# Patient Record
Sex: MALE | Employment: FULL TIME | URBAN - METROPOLITAN AREA
[De-identification: names, ages, dates, MRNs, and addresses within clinical notes are randomized per-mention and may not be internally consistent; named-entity substitution may affect disease eponyms.]

---

## 2017-01-28 ENCOUNTER — HOSPITAL ENCOUNTER (EMERGENCY)
Facility: CLINIC | Age: 72
Discharge: HOME | End: 2017-01-28
Attending: EMERGENCY MEDICINE | Admitting: EMERGENCY MEDICINE
Payer: COMMERCIAL

## 2017-01-28 ENCOUNTER — APPOINTMENT (OUTPATIENT)
Dept: GENERAL RADIOLOGY | Facility: CLINIC | Age: 72
End: 2017-01-28
Attending: EMERGENCY MEDICINE
Payer: COMMERCIAL

## 2017-01-28 VITALS
SYSTOLIC BLOOD PRESSURE: 144 MMHG | WEIGHT: 175 LBS | TEMPERATURE: 98.1 F | OXYGEN SATURATION: 97 % | DIASTOLIC BLOOD PRESSURE: 85 MMHG | RESPIRATION RATE: 18 BRPM | BODY MASS INDEX: 26.52 KG/M2 | HEIGHT: 68 IN | HEART RATE: 66 BPM

## 2017-01-28 DIAGNOSIS — R07.9 CHEST PAIN, UNSPECIFIED TYPE: ICD-10-CM

## 2017-01-28 LAB
ANION GAP SERPL CALCULATED.3IONS-SCNC: 7 MMOL/L (ref 3–14)
BASOPHILS # BLD AUTO: 0 10E9/L (ref 0–0.2)
BASOPHILS NFR BLD AUTO: 0.6 %
BUN SERPL-MCNC: 19 MG/DL (ref 7–30)
CALCIUM SERPL-MCNC: 8.7 MG/DL (ref 8.5–10.1)
CHLORIDE SERPL-SCNC: 108 MMOL/L (ref 94–109)
CO2 SERPL-SCNC: 28 MMOL/L (ref 20–32)
CREAT SERPL-MCNC: 1.05 MG/DL (ref 0.66–1.25)
DIFFERENTIAL METHOD BLD: NORMAL
EOSINOPHIL # BLD AUTO: 0.2 10E9/L (ref 0–0.7)
EOSINOPHIL NFR BLD AUTO: 3.4 %
ERYTHROCYTE [DISTWIDTH] IN BLOOD BY AUTOMATED COUNT: 13.4 % (ref 10–15)
GFR SERPL CREATININE-BSD FRML MDRD: 70 ML/MIN/1.7M2
GLUCOSE SERPL-MCNC: 75 MG/DL (ref 70–99)
HCT VFR BLD AUTO: 49.2 % (ref 40–53)
HGB BLD-MCNC: 16.9 G/DL (ref 13.3–17.7)
IMM GRANULOCYTES # BLD: 0 10E9/L (ref 0–0.4)
IMM GRANULOCYTES NFR BLD: 0.6 %
LYMPHOCYTES # BLD AUTO: 1.3 10E9/L (ref 0.8–5.3)
LYMPHOCYTES NFR BLD AUTO: 20 %
MCH RBC QN AUTO: 31.1 PG (ref 26.5–33)
MCHC RBC AUTO-ENTMCNC: 34.3 G/DL (ref 31.5–36.5)
MCV RBC AUTO: 91 FL (ref 78–100)
MONOCYTES # BLD AUTO: 0.5 10E9/L (ref 0–1.3)
MONOCYTES NFR BLD AUTO: 7.9 %
NEUTROPHILS # BLD AUTO: 4.2 10E9/L (ref 1.6–8.3)
NEUTROPHILS NFR BLD AUTO: 67.5 %
NRBC # BLD AUTO: 0 10*3/UL
NRBC BLD AUTO-RTO: 0 /100
PLATELET # BLD AUTO: 199 10E9/L (ref 150–450)
POTASSIUM SERPL-SCNC: 3.8 MMOL/L (ref 3.4–5.3)
RBC # BLD AUTO: 5.43 10E12/L (ref 4.4–5.9)
SODIUM SERPL-SCNC: 143 MMOL/L (ref 133–144)
TROPONIN I SERPL-MCNC: NORMAL UG/L (ref 0–0.04)
TROPONIN I SERPL-MCNC: NORMAL UG/L (ref 0–0.04)
WBC # BLD AUTO: 6.2 10E9/L (ref 4–11)

## 2017-01-28 PROCEDURE — 80048 BASIC METABOLIC PNL TOTAL CA: CPT | Performed by: EMERGENCY MEDICINE

## 2017-01-28 PROCEDURE — 84484 ASSAY OF TROPONIN QUANT: CPT | Performed by: EMERGENCY MEDICINE

## 2017-01-28 PROCEDURE — 93005 ELECTROCARDIOGRAM TRACING: CPT

## 2017-01-28 PROCEDURE — 85025 COMPLETE CBC W/AUTO DIFF WBC: CPT | Performed by: EMERGENCY MEDICINE

## 2017-01-28 PROCEDURE — 71020 XR CHEST 2 VW: CPT

## 2017-01-28 PROCEDURE — 25000132 ZZH RX MED GY IP 250 OP 250 PS 637: Performed by: EMERGENCY MEDICINE

## 2017-01-28 PROCEDURE — 99285 EMERGENCY DEPT VISIT HI MDM: CPT | Mod: 25

## 2017-01-28 RX ORDER — TAMSULOSIN HYDROCHLORIDE 0.4 MG/1
CAPSULE ORAL DAILY
COMMUNITY
End: 2018-10-16

## 2017-01-28 RX ORDER — NITROGLYCERIN 0.4 MG/1
0.4 TABLET SUBLINGUAL EVERY 5 MIN PRN
Status: DISCONTINUED | OUTPATIENT
Start: 2017-01-28 | End: 2017-01-29 | Stop reason: HOSPADM

## 2017-01-28 RX ORDER — ASPIRIN 325 MG
325 TABLET ORAL ONCE
Status: COMPLETED | OUTPATIENT
Start: 2017-01-28 | End: 2017-01-28

## 2017-01-28 RX ADMIN — NITROGLYCERIN 0.4 MG: 0.4 TABLET SUBLINGUAL at 19:00

## 2017-01-28 RX ADMIN — NITROGLYCERIN 0.4 MG: 0.4 TABLET SUBLINGUAL at 19:14

## 2017-01-28 RX ADMIN — ASPIRIN 325 MG ORAL TABLET 325 MG: 325 PILL ORAL at 19:00

## 2017-01-28 ASSESSMENT — ENCOUNTER SYMPTOMS
NAUSEA: 1
VOMITING: 0

## 2017-01-28 NOTE — ED AVS SNAPSHOT
[facility] Emergency Department    [address]    [facility] [address]    Phone:  [phone]    Fax:  [phone]                                       Myke Fraire   MRN: [id number]    Department:  [facility] Emergency Department   Date of Visit:  1/28/2017           After Visit Summary Signature Page     I have received my discharge instructions, and my questions have been answered. I have discussed any challenges I see with this plan with the nurse or doctor.    ..........................................................................................................................................  Patient/Patient Representative Signature      ..........................................................................................................................................  Patient Representative Print Name and Relationship to Patient    ..................................................               ................................................  Date                                            Time    ..........................................................................................................................................  Reviewed by Signature/Title    ...................................................              ..............................................  Date                                                            Time

## 2017-01-29 LAB — INTERPRETATION ECG - MUSE: NORMAL

## 2017-01-29 NOTE — ED PROVIDER NOTES
History     Chief Complaint:  Chest Pain    HPI   Myke Fraire is a 71 year old male who presents to the emergency department today for evaluation of chest pain. The patient had some chest pain yesterday in the afternoon around 1300 but this pain has been intermittent since he took Atenolol given to him by his significant other. Today he had the pain is again on the left side of the chest. This pain is exacerbated by touching the area and occurred when he was walking around working. He has some nausea but no vomiting. The patient has not taken any Aspirin. He has no history of blood clots.     Allergies:  No Known Drug Allergies       Medications:    Flomax  Gabapentin  Naprosyn  Advil  Centrum Silver  Hydrocodone    Past Medical History:    History reviewed. No pertinent past medical history.     Past Surgical History:    Lumbar spine disc surgery  Cholecystectomy  Gastritis and duodenitis x2  Colonoscopy    Family History:    Mother-stomach cancer  Father-throat cancer     Social History:  The patient was accompanied to the ED by friends.  Smoking Status: never smoker  Alcohol Use: positive    Marital Status:  Single [1]    Review of Systems   Cardiovascular: Positive for chest pain.   Gastrointestinal: Positive for nausea. Negative for vomiting.   All other systems reviewed and are negative.    Physical Exam   Vitals:  Patient Vitals for the past 24 hrs:   BP Temp Temp src Pulse Heart Rate Resp SpO2 Height Weight   01/28/17 2350 144/85 mmHg 98.1  F (36.7  C) Oral - 67 18 97 % - -   01/28/17 2335 135/81 mmHg - - - - - 94 % - -   01/28/17 2320 124/78 mmHg - - - - - 95 % - -   01/28/17 2305 135/82 mmHg - - - - - 96 % - -   01/28/17 2250 139/76 mmHg - - - - - 96 % - -   01/28/17 2235 131/80 mmHg - - - - - 95 % - -   01/28/17 2220 129/80 mmHg - - - - - 95 % - -   01/28/17 2205 135/78 mmHg - - - - - 96 % - -   01/28/17 2150 135/79 mmHg - - - - - 95 % - -   01/28/17 2135 136/81 mmHg - - - - - 97 % - -   01/28/17  "2120 (!) 161/95 mmHg - - - - - 97 % - -   01/28/17 2105 143/88 mmHg - - - - - 98 % - -   01/28/17 2050 (!) 141/92 mmHg - - - - - 96 % - -   01/28/17 2035 140/85 mmHg - - - - - 98 % - -   01/28/17 2020 131/76 mmHg - - - - - 98 % - -   01/28/17 2010 121/73 mmHg - - - - - - - -   01/28/17 1920 112/75 mmHg - - - 60 - 91 % - -   01/28/17 1915 - - - - 57 - 92 % - -   01/28/17 1905 126/76 mmHg - - - 62 - 93 % - -   01/28/17 1852 (!) 128/96 mmHg - - - 57 - 98 % - -   01/28/17 1835 (!) 161/102 mmHg - - - - - - - -   01/28/17 1829 (!) 172/100 mmHg - - - - - - - -   01/28/17 1820 (!) 146/102 mmHg 97.9  F (36.6  C) Oral 66 - 18 96 % 1.727 m (5' 8\") 79.379 kg (175 lb)       Physical Exam  Constitutional: Patient is well appearing. No distress.  Head: Atraumatic.  Mouth/Throat: Oropharynx is clear and moist. No oropharyngeal exudate.  Eyes: Conjunctivae and EOM are normal. No scleral icterus.  Neck: Normal range of motion. Neck supple.   Cardiovascular: Normal rate, regular rhythm, normal heart sounds and intact distal pulses.   Pulmonary/Chest: Breath sounds normal. No respiratory distress.  Abdominal: Soft. Bowel sounds are normal. No distension. No tenderness. No rebound or guarding.   Musculoskeletal: Normal range of motion. No edema or tenderness.   Neurological: Alert and orientated to person, place, and time. No observable focal neuro deficit  Skin: Warm and dry. No rash noted. Not diaphoretic.      Emergency Department Course     ECG:  ECG taken at 1829, ECG read at 1836  Normal sinus rhythm  Normal ECG  Rate 64 bpm. SC interval 162. QRS duration 92. QT/QTc 392/404. P-R-T axes 54 -26 45.     Imaging:  Radiology findings were communicated with the patient who voiced understanding of the findings.    XR Chest 2 Views   IMPRESSION: The lungs are clear. No focal pulmonary opacities. Heart   and mediastinum are stable. No acute cardiopulmonary abnormalities.      ROWAN JO MD     Laboratory:  Laboratory findings were " communicated with the patient who voiced understanding of the findings.    CBC: WBC 6.2, HGB 16.9,   BMP: AWNL (Creatinine 1.05)  Troponin (Collected 1848): <0.015   Troponin (Collected 2229): <0.015     Interventions:  1900 Nitrostat 0.4 mg sublingual  1900 Aspirin 325 mg oral    1914 Nitrostat 0.4 mg sublingual      Emergency Department Course:  Nursing notes and vitals reviewed.  I performed an exam of the patient as documented above.   The patient was sent for a chest x-ray while in the emergency department, results above.    IV was inserted and blood was drawn for laboratory testing, results above.   At 2341 the patient was rechecked on and was updated on the results of his laboratory and imaging studies.    I discussed the treatment plan with the patient. They expressed understanding of this plan and consented to discharge. They will be discharged home with instructions for care and follow up. In addition, the patient will return to the emergency department if their symptoms persist, worsen, if new symptoms arise or if there is any concern.  All questions were answered.  I personally reviewed the laboratory and imaging results with the patient and answered all related questions prior to discharge.    Impression & Plan      Medical Decision Making:  Pt has atypical sx with reproduction with palpation.  No known margarita risk factors.  >24 since sx started.  2 trop neg.  Unlikely acute ACS.  PERC neg.  Unlikely other CV resp cause.  Offered admission and workup vs home and PCP stress and reeval.  Risks and benefits discussed with pt and family.  Shared decision making and wants to go home.     Diagnosis:    ICD-10-CM    1. Chest pain, unspecified type R07.9      Disposition:   Discharge    Scribe Disclosure:  Fransisco STANLEY, am serving as a scribe at 6:30 PM on 1/28/2017 to document services personally performed by Fransisco Wyatt MD, based on my observations and the provider's statements to me.    1/28/2017   [facility] EMERGENCY DEPARTMENT        Fransisco Wyatt MD  01/29/17 0036

## 2017-01-29 NOTE — DISCHARGE INSTRUCTIONS
* Dolor En El Pecho:Causa No Determinada [Chest Pain, Uncertain Cause]    Según de jesus examen de hoy, no se pudo determinar exactamente por qué siente dolor en el pecho. De Jesus afección no parece seria en milla momento y el dolor que siente no parece provenir del corazón. Sin embargo, en ocasiones, los síntomas de un problema burak tardan más en aparecer. Por lo tanto, es importante que preste atención a las advertencias descritas a continuación.  Cuidados En [place]:  1. Descanse hoy y evite toda actividad agotadora.  2. Lyman el medicamento que le hayan recetado según le hayan indicado.  Programe tim VISITA DE CONTROL con de jesus médico en 1-3 días.  Busque Prontamente Atención Médica  si algo de lo siguiente ocurre:    Un cambio en el tipo de dolor: se siente diferente, se ha vuelto más herminio, dura más o comienza a esparcirse al hombro, el brazo, el noe, la mandíbula o la espalda.    Dificultad para respirar o más dolor al respirar.    Debilidad, mareo o desmayo.    Tos con expulsión de esputo (flema [phlegm]) de color oscuro o con mayi.    Fiebre de 101 F (38.3 C) o más kendra.    Dolor, enrojecimiento o hinchazón de tim pierna.    [id number] [facility], [address] [place] [address], [place] [address]. Todos los derechos reservados. Esta información no pretende sustituir la atención médica profesional. Sólo de jesus médico puede diagnosticar y tratar un problema de dilip.

## 2017-01-29 NOTE — ED NOTES
ABCs intact. Pt c/o left sided CP, lightheaded, high blood pressure (187/95 160/87 148/86).    Pt's home meds: see epic

## 2018-10-16 ENCOUNTER — HOSPITAL ENCOUNTER (EMERGENCY)
Facility: CLINIC | Age: 73
Discharge: HOME | End: 2018-10-16
Attending: EMERGENCY MEDICINE | Admitting: EMERGENCY MEDICINE
Payer: COMMERCIAL

## 2018-10-16 ENCOUNTER — APPOINTMENT (OUTPATIENT)
Dept: GENERAL RADIOLOGY | Facility: CLINIC | Age: 73
End: 2018-10-16
Attending: EMERGENCY MEDICINE
Payer: COMMERCIAL

## 2018-10-16 VITALS
BODY MASS INDEX: 26 KG/M2 | TEMPERATURE: 97.9 F | OXYGEN SATURATION: 97 % | WEIGHT: 171 LBS | RESPIRATION RATE: 18 BRPM | SYSTOLIC BLOOD PRESSURE: 153 MMHG | DIASTOLIC BLOOD PRESSURE: 99 MMHG

## 2018-10-16 DIAGNOSIS — M54.50 ACUTE RIGHT-SIDED LOW BACK PAIN WITHOUT SCIATICA: ICD-10-CM

## 2018-10-16 PROCEDURE — 25000132 ZZH RX MED GY IP 250 OP 250 PS 637: Performed by: EMERGENCY MEDICINE

## 2018-10-16 PROCEDURE — 99283 EMERGENCY DEPT VISIT LOW MDM: CPT

## 2018-10-16 PROCEDURE — 72100 X-RAY EXAM L-S SPINE 2/3 VWS: CPT

## 2018-10-16 RX ORDER — OXYCODONE HYDROCHLORIDE 5 MG/1
5 TABLET ORAL ONCE
Status: COMPLETED | OUTPATIENT
Start: 2018-10-16 | End: 2018-10-16

## 2018-10-16 RX ORDER — ACETAMINOPHEN 500 MG
1000 TABLET ORAL EVERY 6 HOURS
Qty: 60 TABLET | Refills: 0 | Status: SHIPPED | OUTPATIENT
Start: 2018-10-16 | End: 2018-10-19

## 2018-10-16 RX ORDER — IBUPROFEN 200 MG
400 TABLET ORAL ONCE
Status: COMPLETED | OUTPATIENT
Start: 2018-10-16 | End: 2018-10-16

## 2018-10-16 RX ORDER — IBUPROFEN 200 MG
400 TABLET ORAL EVERY 6 HOURS
Qty: 50 TABLET | Refills: 0 | Status: SHIPPED | OUTPATIENT
Start: 2018-10-16 | End: 2018-10-19

## 2018-10-16 RX ORDER — ACETAMINOPHEN 325 MG/1
975 TABLET ORAL ONCE
Status: COMPLETED | OUTPATIENT
Start: 2018-10-16 | End: 2018-10-16

## 2018-10-16 RX ADMIN — OXYCODONE HYDROCHLORIDE 5 MG: 5 TABLET ORAL at 20:22

## 2018-10-16 RX ADMIN — IBUPROFEN 400 MG: 200 TABLET, FILM COATED ORAL at 20:21

## 2018-10-16 RX ADMIN — ACETAMINOPHEN 975 MG: 325 TABLET, FILM COATED ORAL at 20:21

## 2018-10-16 ASSESSMENT — ENCOUNTER SYMPTOMS
HEMATURIA: 0
BACK PAIN: 1
CONSTIPATION: 0
BLOOD IN STOOL: 0

## 2018-10-16 NOTE — LETTER
October 16, 2018      To Whom It May Concern:      Myke Fraire was seen in our Emergency Department today, 10/16/18.  I expect his condition to improve over the next 3 days.  He may return to work/school when improved with the restriction of no heavy lifting until approved by his primary doctor.    Sincerely,        Markus Long MD

## 2018-10-16 NOTE — ED AVS SNAPSHOT
[facility] Emergency Department    [address]    [facility] [place] [address]    Phone:  [phone]    Fax:  [phone]                                       Myke Fraire   MRN: [id number]    Department:  [facility] Emergency Department   Date of Visit:  10/16/2018           After Visit Summary Signature Page     I have received my discharge instructions, and my questions have been answered. I have discussed any challenges I see with this plan with the nurse or doctor.    ..........................................................................................................................................  Patient/Patient Representative Signature      ..........................................................................................................................................  Patient Representative Print Name and Relationship to Patient    ..................................................               ................................................  Date                                   Time    ..........................................................................................................................................  Reviewed by Signature/Title    ...................................................              ..............................................  Date                                               Time          22EPIC Rev 08/18

## 2018-10-16 NOTE — ED AVS SNAPSHOT
[facility] Emergency Department    [address] [place] [address]    Phone:  [phone]    Fax:  [phone]                                       Myke Fraire   MRN: [id number]    Department:  [facility] Emergency Department   Date of Visit:  10/16/2018           Patient Information     Date Of Birth          1945        Your diagnoses for this visit were:     Acute right-sided low back pain without sciatica        You were seen by Markus Long MD.      Follow-up Information     Follow up with Bi Suarez NP. Schedule an appointment as soon as possible for a visit in 3 days.    Specialty:  Nurse Practitioner    Contact information:    [address] [place]  [address]   [place] [address]  [phone]          Discharge Instructions       Discharge Instructions  Back Pain  You were seen today for back pain. Back pain can have many causes, but most will get better without surgery or other specific treatment. Sometimes there is a herniated ( slipped ) disc. We do not usually do MRI scans to look for these right away, since most herniated discs will get better on their own with time.  Today, we did not find any evidence that your back pain was caused by a serious condition. However, sometimes symptoms develop over time and cannot be found during an emergency visit, so it is very important that you follow up with your primary provider.  Generally, every Emergency Department visit should have a follow-up clinic visit with either a primary or a specialty clinic/provider. Please follow-up as instructed by your emergency provider today.    Return to the Emergency Department if:    You develop a fever with your back pain.     You have weakness or change in sensation in one or both legs.    You lose control of your bowels or bladder, or cannot empty your bladder (cannot pee).    Your pain gets much worse.     Follow-up with your  provider:    Unless your pain has completely gone away, please make an appointment with your provider within one week. Most of the routine care for back pain is available in a clinic and not the Emergency Department. You may need further management of your back pain, such as more pain medication, imaging such as an X-ray or MRI, or physical therapy.    What can I do to help myself?    Remain Active -- People are often afraid that they will hurt their back further or delay recovery by remaining active, but this is one of the best things you can do for your back. In fact, staying in bed for a long time to rest is not recommended. Studies have shown that people with low back pain recover faster when they remain active. Movement helps to bring blood flow to the muscles and relieve muscle spasms as well as preventing loss of muscle strength.  - TENS unit can be purchased at [employer].    Heat -- Using a heating pad can help with low back pain during the first few weeks. Do not sleep with a heating pad, as you can be burned.     Pain medications - You may take a pain medication such as Tylenol  (acetaminophen), Advil , Motrin  (ibuprofen) or Aleve  (naproxen).  If you were given a prescription for medicine here today, be sure to read all of the information (including the package insert) that comes with your prescription.  This will include important information about the medicine, its side effects, and any warnings that you need to know about.  The pharmacist who fills the prescription can provide more information and answer questions you may have about the medicine.  If you have questions or concerns that the pharmacist cannot address, please call or return to the Emergency Department.   Remember that you can always come back to the Emergency Department if you are not able to see your regular provider in the amount of time listed above, if you get any new symptoms, or if there is anything that worries you.      24 Hour  Appointment Hotline       To make an appointment at any [facility], call [phone] ([phone]). If you don't have a family doctor or clinic, we will help you find one. [facility] are conveniently located to serve the needs of you and your family.             Review of your medicines      START taking        Dose / Directions Last dose taken    acetaminophen 500 MG tablet   Commonly known as:  TYLENOL   Dose:  1000 mg   Quantity:  60 tablet        Take 2 tablets (1,000 mg) by mouth every 6 hours for 3 days   Refills:  0          CONTINUE these medicines which may have CHANGED, or have new prescriptions. If we are uncertain of the size of tablets/capsules you have at home, strength may be listed as something that might have changed.        Dose / Directions Last dose taken    * ADVIL 200 MG capsule   Dose:  200 mg   What changed:  Another medication with the same name was added. Make sure you understand how and when to take each.   Quantity:  120 capsule   Generic drug:  ibuprofen        Take 200 mg by mouth every 4 hours as needed for fever.   Refills:  0        * ibuprofen 200 MG tablet   Commonly known as:  ADVIL/MOTRIN   Dose:  400 mg   What changed:  You were already taking a medication with the same name, and this prescription was added. Make sure you understand how and when to take each.   Quantity:  50 tablet        Take 2 tablets (400 mg) by mouth every 6 hours for 3 days   Refills:  0        * Notice:  This list has 2 medication(s) that are the same as other medications prescribed for you. Read the directions carefully, and ask your doctor or other care provider to review them with you.      Our records show that you are taking the medicines listed below. If these are incorrect, please call your family doctor or clinic.        Dose / Directions Last dose taken    ALEVE PO        Refills:  0                Prescriptions were sent or printed at these locations (2 Prescriptions)                    Other Prescriptions                Printed at Department/Unit printer (2 of 2)         acetaminophen (TYLENOL) 500 MG tablet               ibuprofen (ADVIL/MOTRIN) 200 MG tablet                Procedures and tests performed during your visit     Lumbar spine XR, 2-3 views      Orders Needing Specimen Collection     None      Pending Results     Date and Time Order Name Status Description    10/16/2018 2112 Lumbar spine XR, 2-3 views Preliminary             Pending Culture Results     No orders found from 10/14/2018 to 10/17/2018.            Pending Results Instructions     If you had any lab results that were not finalized at the time of your Discharge, you can call the ED Lab Result RN at [phone]. You will be contacted by this team for any positive Lab results or changes in treatment. The nurses are available 7 days a week from 10A to 6:30P.  You can leave a message 24 hours per day and they will return your call.        Test Results From Your Hospital Stay        10/16/2018  9:36 PM      Narrative     XR LUMBAR SPINE TWO-THREE VIEWS   10/16/2018 9:28 PM     HISTORY: Low back pain.    COMPARISON: None.     FINDINGS: There is no acute fracture or traumatic malalignment. There  is endplate osteophyte formation at all levels. Mild disc height loss  at L3-L4. Degenerative facet sclerosis extends from L4-S1.         Impression     IMPRESSION: No acute abnormality. Multilevel degenerative disease.                Clinical Quality Measure: Blood Pressure Screening     Your blood pressure was checked while you were in the emergency department today. The last reading we obtained was  BP: (!) 153/99 . Please read the guidelines below about what these numbers mean and what you should do about them.  If your systolic blood pressure (the top number) is less than 120 and your diastolic blood pressure (the bottom number) is less than 80, then your blood pressure is normal. There is nothing more that you need to do  "about it.  If your systolic blood pressure (the top number) is 120-139 or your diastolic blood pressure (the bottom number) is 80-89, your blood pressure may be higher than it should be. You should have your blood pressure rechecked within a year by a primary care provider.  If your systolic blood pressure (the top number) is 140 or greater or your diastolic blood pressure (the bottom number) is 90 or greater, you may have high blood pressure. High blood pressure is treatable, but if left untreated over time it can put you at risk for heart attack, stroke, or kidney failure. You should have your blood pressure rechecked by a primary care provider within the next 4 weeks.  If your provider in the emergency department today gave you specific instructions to follow-up with your doctor or provider even sooner than that, you should follow that instruction and not wait for up to 4 weeks for your follow-up visit.        Thank you for choosing [facility]       Thank you for choosing [facility] for your care. Our goal is always to provide you with excellent care. Hearing back from our patients is one way we can continue to improve our services. Please take a few minutes to complete the written survey that you may receive in the mail after you visit with us. Thank you!        [employer] Information     [facility] lets you send messages to your doctor, view your test results, renew your prescriptions, schedule appointments and more. To sign up, go to [email or web address] . Click on \"Log in\" on the left side of the screen, which will take you to the Welcome page. Then click on \"Sign up Now\" on the right side of the page.     You will be asked to enter the access code listed below, as well as some personal information. Please follow the directions to create your username and password.     Your access code is: [email or web address]  Expires: 2019  9:55 PM     Your access code will  in 90 days. If you need help or a new code, please " call your [place] clinic or [phone].        Care EveryWhere ID     This is your Care EveryWhere ID. This could be used by other organizations to access your [facility] [place] medical records  [id number]        Equal Access to Services     WINSOME MESSINA : Rob Bond, waaxda luqadaha, qaybta kaalmada farhan, pascale sanchez. So [facility] [phone].    ATENCIÓN: Si habla español, tiene a de jesus disposición servicios gratuitos de asistencia lingüística. Llame al [phone].    We comply with applicable federal civil rights laws and [place] laws. We do not discriminate on the basis of race, color, national origin, age, disability, sex, sexual orientation, or gender identity.            After Visit Summary       This is your record. Keep this with you and show to your community pharmacist(s) and doctor(s) at your next visit.

## 2018-10-17 NOTE — ED TRIAGE NOTES
Patient sent of from [facility] for further eval of c/o low back pain with decreased sensation and diffuculty walking, affecting right leg. Denies loss of bowel or bladder and no fevers.

## 2018-10-17 NOTE — ED PROVIDER NOTES
History     Chief Complaint:  Back Pain      HPI   Myke Fraire is a 72 year old male who presents with his family for evaluation of right sided lower back pain which is radiating to the front. The patient reports that he had a normal day yesterday until 2100 when the pain started. He reports that he works night shifts and had to take off work due to the pain. It hurts the most when he is walking but the patient reports that he also experiences pain with sitting and even when he lays still. The patient reports that he is currently in pain while laying on the gurney.    He reports that he has had similar pain in the past but not this intense. He reports that it hurts to raise the leg. The patient had lower back surgery in 2001, and 2003.  He does not take any medications daily. The patient took Aleve, ibuprofen, and Advil last evening without improvement. He denies fevers. He denies pain with urination. He denies any bowel or urinary incontinence. He denies falling or other injury at work. The patient has never been a smoker. He has been using cold and heat packs for pain control.      Allergies:  No known drug allergies.     Medications:    Advil  Aleve    Past Medical History:    Infectious colitis, enteritis, and gastroenteritis  BPH (benign prostatic hyperplasia)  Past Surgical History:    History reviewed. No pertinent past surgical history.     Family History:    History reviewed. No pertinent family history.     Social History:  Marital Status:  Single   Smoking status: No   Alcohol use: No         Past Surgical History:    Gastritis and duodenitis  Cholecystectomy  Disk surgery, lumbar spine     Family History:    Cancer    Social History:  The patient reports that he has never smoked. He has never used smokeless tobacco. He reports that he drinks alcohol. He reports that he does not use illicit drugs.   Marital Status:   [2]     Review of Systems   Gastrointestinal: Negative for blood in stool  "and constipation.   Genitourinary: Negative for decreased urine volume and hematuria.   Musculoskeletal: Positive for back pain.   All other systems reviewed and are negative.      Physical Exam     Vital signs    Estimated body mass index is 26 kg/(m^2) as calculated from the following:    Height as of 1/28/17: 1.727 m (5' 8\").    Weight as of this encounter: 77.6 kg (171 lb).    Patient Vitals for the past 24 hrs:   BP Temp Temp src Heart Rate Resp SpO2 Weight   10/16/18 2148 - - - - - 97 % -   10/16/18 2145 (!) 153/99 - - - - - -   10/16/18 2100 (!) 144/94 - - - - 96 % -   10/16/18 2045 (!) 145/98 - - - - 94 % -   10/16/18 2030 (!) 145/93 - - - - 98 % -   10/16/18 2015 (!) 141/92 - - - - 96 % -   10/16/18 2000 (!) 161/96 - - - - 96 % -   10/16/18 1927 (!) 155/104 97.9  F (36.6  C) Temporal 63 18 98 % 77.6 kg (171 lb)          Physical Exam    HENT: Moist oral mucosa.   Eyes: Grossly normal vision. Pupils are equal and round. Extraocular movements intact.  Sclera clear and without icterus. Conjunctiva without pallor.  Cardiovascular: Normal rate. Regular rhythm. S1 and S2 without audible murmurs. 2+ radial pulses. Normal capillary refill.  Respiratory: Effort normal. Clear breath sounds bilaterally.  Gastrointestinal: Soft. No distension. No tenderness to palpation. No rebound or guarding.  Neurologic: Alert and awake. Coordinated movement of extremities. Speech normal.  Strength is 4/5 with right hip flexion, knee flexion, knee extension limited due to pain.  Right ankle dorsiflexion, EHL, ankle plantarflexion is 5/5.  Left lower extremity is 5/5 strength throughout.  Sensation to light touch subjectively decreased over the lateral right tibia extending to the lateral aspect of the right foot.  No medial involvement or plantar involvement or perianal involvement.  Patellar reflexes are symmetric.  No ankle clonus bilaterally.  Babinski downgoing bilaterally.  Skin: No diaphoresis, rashes, ecchymoses, or " lesions.  Musculoskeletal: No lower extremity edema. No gross deformity. No joint swelling.  Tenderness to palpation at the right upper gluteal region.  No midline spinal tenderness.  There is minimal right lumbar paraspinous muscle tenderness.  Passive range of motion at the right hip without difficulty until 90 degrees of flexion, when the posterior pain is reproduced.  The patient is able to bear weight on the affected extremity but has an antalgic gait.  No tenderness to palpation at the hip or knee joint.  Psychiatric: Appropriate affect. Behavior is normal. Intact recent and remote memory. Linear thought process.      Emergency Department Course   Imaging:  Lumbar spine XR, 2-3 views   Preliminary Result   IMPRESSION: No acute abnormality. Multilevel degenerative disease.         I communicated the results of the imaging studies with the patient and his family who expressed understanding of these findings.      Interventions:  2021: Tylenol 975mg PO  2021: Advil 400mg PO  2022: Oxycodone 5mg PO    Emergency Department Course:  Past medical records, nursing notes, and vitals reviewed.  1956: I performed an exam of the patient and obtained history, as documented above.       The patient was sent for a Lumbar spine XR while in the emergency department, findings above.     Rechecked the patient, findings and plan explained to the patient. Patient discharged home, status improved, with instructions regarding supportive care, medications, and reasons to return as well as the importance of close follow-up was reviewed.    Impression & Plan    Medical Decision Making:  Patient presents with atraumatic right-sided low back pain over the last few days.  Exam shows point muscle tenderness over the right gluteus region.  There is some limitations to movement of the right lower extremity and strength deficits, but I perceive these to be secondary to the patient's pain.  He also has some subjective decreased sensation over  the lateral aspect of his distal right lower extremity.  He has no other upper or lower motor neuron signs to suggest spinal cord impingement.  I have low suspicion for kidney stone, joint infection, AAA as the etiology of his pain.  This is likely musculoskeletal related to pinched nerve or muscle spasm.  After Tylenol, ibuprofen, oxycodone, heat compression in the emergency department, the patient did have some improvement in his symptoms and range of motion.  He was able to walk, although with an antalgic gait.  X-ray of the lumbar spine was performed to rule out pathologic fracture as the etiology of his pain.  This was negative.  The patient was given the option of admission to the hospital for further symptom control versus trial of therapy at home and close outpatient follow-up.  The patient elects to be discharged home with close outpatient follow-up.  He will be given prescriptions for Tylenol and ibuprofen.  He was instructed on the use of heat compresses, massage, TENS unit.  He was instructed to follow-up closely with his regular doctor for further referrals and management.  He was given return precautions for severe pain, inability to move his leg, difficulty voiding, stool incontinence, fever.      Diagnosis:    ICD-10-CM    1. Acute right-sided low back pain without sciatica M54.5        Disposition:  discharged to home    Discharge Medications:  New Prescriptions    ACETAMINOPHEN (TYLENOL) 500 MG TABLET    Take 2 tablets (1,000 mg) by mouth every 6 hours for 3 days    IBUPROFEN (ADVIL/MOTRIN) 200 MG TABLET    Take 2 tablets (400 mg) by mouth every 6 hours for 3 days     Gio STANLEY am serving as a scribe at 7:56 PM on 10/16/2018 to document services personally performed by Markus Long MD based on my observations and the provider's statements to me.    [facility] EMERGENCY DEPARTMENT       Markus Long MD  10/16/18 [id number]

## 2018-10-17 NOTE — DISCHARGE INSTRUCTIONS
Discharge Instructions  Back Pain  You were seen today for back pain. Back pain can have many causes, but most will get better without surgery or other specific treatment. Sometimes there is a herniated ( slipped ) disc. We do not usually do MRI scans to look for these right away, since most herniated discs will get better on their own with time.  Today, we did not find any evidence that your back pain was caused by a serious condition. However, sometimes symptoms develop over time and cannot be found during an emergency visit, so it is very important that you follow up with your primary provider.  Generally, every Emergency Department visit should have a follow-up clinic visit with either a primary or a specialty clinic/provider. Please follow-up as instructed by your emergency provider today.    Return to the Emergency Department if:    You develop a fever with your back pain.     You have weakness or change in sensation in one or both legs.    You lose control of your bowels or bladder, or cannot empty your bladder (cannot pee).    Your pain gets much worse.     Follow-up with your provider:    Unless your pain has completely gone away, please make an appointment with your provider within one week. Most of the routine care for back pain is available in a clinic and not the Emergency Department. You may need further management of your back pain, such as more pain medication, imaging such as an X-ray or MRI, or physical therapy.    What can I do to help myself?    Remain Active -- People are often afraid that they will hurt their back further or delay recovery by remaining active, but this is one of the best things you can do for your back. In fact, staying in bed for a long time to rest is not recommended. Studies have shown that people with low back pain recover faster when they remain active. Movement helps to bring blood flow to the muscles and relieve muscle spasms as well as preventing loss of muscle  strength.  - TENS unit can be purchased at [facility].    Heat -- Using a heating pad can help with low back pain during the first few weeks. Do not sleep with a heating pad, as you can be burned.     Pain medications - You may take a pain medication such as Tylenol  (acetaminophen), Advil , Motrin  (ibuprofen) or Aleve  (naproxen).  If you were given a prescription for medicine here today, be sure to read all of the information (including the package insert) that comes with your prescription.  This will include important information about the medicine, its side effects, and any warnings that you need to know about.  The pharmacist who fills the prescription can provide more information and answer questions you may have about the medicine.  If you have questions or concerns that the pharmacist cannot address, please call or return to the Emergency Department.   Remember that you can always come back to the Emergency Department if you are not able to see your regular provider in the amount of time listed above, if you get any new symptoms, or if there is anything that worries you.

## 2020-02-20 ENCOUNTER — APPOINTMENT (OUTPATIENT)
Dept: MRI IMAGING | Facility: CLINIC | Age: 75
End: 2020-02-20
Attending: EMERGENCY MEDICINE
Payer: COMMERCIAL

## 2020-02-20 ENCOUNTER — APPOINTMENT (OUTPATIENT)
Dept: CT IMAGING | Facility: CLINIC | Age: 75
End: 2020-02-20
Attending: EMERGENCY MEDICINE
Payer: COMMERCIAL

## 2020-02-20 ENCOUNTER — APPOINTMENT (OUTPATIENT)
Dept: GENERAL RADIOLOGY | Facility: CLINIC | Age: 75
End: 2020-02-20
Attending: EMERGENCY MEDICINE
Payer: COMMERCIAL

## 2020-02-20 ENCOUNTER — HOSPITAL ENCOUNTER (OUTPATIENT)
Facility: CLINIC | Age: 75
Setting detail: OBSERVATION
Discharge: HOME | End: 2020-02-21
Attending: EMERGENCY MEDICINE | Admitting: HOSPITALIST
Payer: COMMERCIAL

## 2020-02-20 DIAGNOSIS — I63.9 CEREBROVASCULAR ACCIDENT (CVA), UNSPECIFIED MECHANISM (H): Primary | ICD-10-CM

## 2020-02-20 DIAGNOSIS — I63.81 LACUNAR INFARCT, ACUTE (H): ICD-10-CM

## 2020-02-20 DIAGNOSIS — K52.9 GASTROENTERITIS: ICD-10-CM

## 2020-02-20 LAB
ALBUMIN SERPL-MCNC: 4.3 G/DL (ref 3.4–5)
ALP SERPL-CCNC: 104 U/L (ref 40–150)
ALT SERPL W P-5'-P-CCNC: 31 U/L (ref 0–70)
ANION GAP SERPL CALCULATED.3IONS-SCNC: 4 MMOL/L (ref 3–14)
AST SERPL W P-5'-P-CCNC: 19 U/L (ref 0–45)
BASOPHILS # BLD AUTO: 0 10E9/L (ref 0–0.2)
BASOPHILS NFR BLD AUTO: 0.2 %
BILIRUB SERPL-MCNC: 1.6 MG/DL (ref 0.2–1.3)
BUN SERPL-MCNC: 21 MG/DL (ref 7–30)
CALCIUM SERPL-MCNC: 8.5 MG/DL (ref 8.5–10.1)
CHLORIDE SERPL-SCNC: 107 MMOL/L (ref 94–109)
CO2 SERPL-SCNC: 26 MMOL/L (ref 20–32)
CREAT SERPL-MCNC: 1.07 MG/DL (ref 0.66–1.25)
DIFFERENTIAL METHOD BLD: ABNORMAL
EOSINOPHIL # BLD AUTO: 0 10E9/L (ref 0–0.7)
EOSINOPHIL NFR BLD AUTO: 0.3 %
ERYTHROCYTE [DISTWIDTH] IN BLOOD BY AUTOMATED COUNT: 13.2 % (ref 10–15)
FLUAV+FLUBV AG SPEC QL: NEGATIVE
FLUAV+FLUBV AG SPEC QL: NEGATIVE
GFR SERPL CREATININE-BSD FRML MDRD: 69 ML/MIN/{1.73_M2}
GLUCOSE SERPL-MCNC: 99 MG/DL (ref 70–99)
HCT VFR BLD AUTO: 55.2 % (ref 40–53)
HGB BLD-MCNC: 18.1 G/DL (ref 13.3–17.7)
IMM GRANULOCYTES # BLD: 0.1 10E9/L (ref 0–0.4)
IMM GRANULOCYTES NFR BLD: 0.5 %
LIPASE SERPL-CCNC: 66 U/L (ref 73–393)
LYMPHOCYTES # BLD AUTO: 0.4 10E9/L (ref 0.8–5.3)
LYMPHOCYTES NFR BLD AUTO: 3.1 %
MCH RBC QN AUTO: 30.8 PG (ref 26.5–33)
MCHC RBC AUTO-ENTMCNC: 32.8 G/DL (ref 31.5–36.5)
MCV RBC AUTO: 94 FL (ref 78–100)
MONOCYTES # BLD AUTO: 0.8 10E9/L (ref 0–1.3)
MONOCYTES NFR BLD AUTO: 6.4 %
NEUTROPHILS # BLD AUTO: 11.6 10E9/L (ref 1.6–8.3)
NEUTROPHILS NFR BLD AUTO: 89.5 %
NRBC # BLD AUTO: 0 10*3/UL
NRBC BLD AUTO-RTO: 0 /100
PLATELET # BLD AUTO: 204 10E9/L (ref 150–450)
POTASSIUM SERPL-SCNC: 3.7 MMOL/L (ref 3.4–5.3)
PROT SERPL-MCNC: 7.8 G/DL (ref 6.8–8.8)
RBC # BLD AUTO: 5.87 10E12/L (ref 4.4–5.9)
SODIUM SERPL-SCNC: 137 MMOL/L (ref 133–144)
SPECIMEN SOURCE: NORMAL
TROPONIN I SERPL-MCNC: <0.015 UG/L (ref 0–0.04)
WBC # BLD AUTO: 13 10E9/L (ref 4–11)

## 2020-02-20 PROCEDURE — 71046 X-RAY EXAM CHEST 2 VIEWS: CPT

## 2020-02-20 PROCEDURE — 25000128 H RX IP 250 OP 636: Performed by: EMERGENCY MEDICINE

## 2020-02-20 PROCEDURE — 93005 ELECTROCARDIOGRAM TRACING: CPT

## 2020-02-20 PROCEDURE — 25800030 ZZH RX IP 258 OP 636: Performed by: PHYSICIAN ASSISTANT

## 2020-02-20 PROCEDURE — 83690 ASSAY OF LIPASE: CPT | Performed by: EMERGENCY MEDICINE

## 2020-02-20 PROCEDURE — 99220 ZZC INITIAL OBSERVATION CARE,LEVL III: CPT | Performed by: PHYSICIAN ASSISTANT

## 2020-02-20 PROCEDURE — G0378 HOSPITAL OBSERVATION PER HR: HCPCS

## 2020-02-20 PROCEDURE — 84484 ASSAY OF TROPONIN QUANT: CPT | Performed by: EMERGENCY MEDICINE

## 2020-02-20 PROCEDURE — 96361 HYDRATE IV INFUSION ADD-ON: CPT

## 2020-02-20 PROCEDURE — 85025 COMPLETE CBC W/AUTO DIFF WBC: CPT | Performed by: EMERGENCY MEDICINE

## 2020-02-20 PROCEDURE — 87804 INFLUENZA ASSAY W/OPTIC: CPT | Performed by: EMERGENCY MEDICINE

## 2020-02-20 PROCEDURE — 80053 COMPREHEN METABOLIC PANEL: CPT | Performed by: EMERGENCY MEDICINE

## 2020-02-20 PROCEDURE — 96374 THER/PROPH/DIAG INJ IV PUSH: CPT | Mod: 59

## 2020-02-20 PROCEDURE — 70551 MRI BRAIN STEM W/O DYE: CPT

## 2020-02-20 PROCEDURE — 25000132 ZZH RX MED GY IP 250 OP 250 PS 637: Performed by: EMERGENCY MEDICINE

## 2020-02-20 PROCEDURE — 74177 CT ABD & PELVIS W/CONTRAST: CPT

## 2020-02-20 PROCEDURE — 25000132 ZZH RX MED GY IP 250 OP 250 PS 637: Performed by: PHYSICIAN ASSISTANT

## 2020-02-20 PROCEDURE — 99285 EMERGENCY DEPT VISIT HI MDM: CPT | Mod: 25

## 2020-02-20 PROCEDURE — 25800030 ZZH RX IP 258 OP 636: Performed by: EMERGENCY MEDICINE

## 2020-02-20 RX ORDER — POLYETHYLENE GLYCOL 3350 17 G/17G
17 POWDER, FOR SOLUTION ORAL DAILY PRN
COMMUNITY

## 2020-02-20 RX ORDER — ACETAMINOPHEN 500 MG
1000 TABLET ORAL ONCE
Status: COMPLETED | OUTPATIENT
Start: 2020-02-20 | End: 2020-02-20

## 2020-02-20 RX ORDER — ASPIRIN 81 MG/1
81 TABLET, CHEWABLE ORAL DAILY
Status: DISCONTINUED | OUTPATIENT
Start: 2020-02-20 | End: 2020-02-20

## 2020-02-20 RX ORDER — IOPAMIDOL 755 MG/ML
500 INJECTION, SOLUTION INTRAVASCULAR ONCE
Status: COMPLETED | OUTPATIENT
Start: 2020-02-20 | End: 2020-02-20

## 2020-02-20 RX ORDER — ASPIRIN 81 MG/1
81 TABLET ORAL DAILY
Status: DISCONTINUED | OUTPATIENT
Start: 2020-02-20 | End: 2020-02-21 | Stop reason: HOSPADM

## 2020-02-20 RX ORDER — ATORVASTATIN CALCIUM 20 MG/1
20 TABLET, FILM COATED ORAL EVERY EVENING
Status: DISCONTINUED | OUTPATIENT
Start: 2020-02-20 | End: 2020-02-21 | Stop reason: HOSPADM

## 2020-02-20 RX ORDER — DICYCLOMINE HCL 20 MG
20 TABLET ORAL EVERY 6 HOURS PRN
Status: DISCONTINUED | OUTPATIENT
Start: 2020-02-20 | End: 2020-02-21 | Stop reason: HOSPADM

## 2020-02-20 RX ORDER — SODIUM CHLORIDE 9 MG/ML
INJECTION, SOLUTION INTRAVENOUS CONTINUOUS
Status: DISCONTINUED | OUTPATIENT
Start: 2020-02-20 | End: 2020-02-21 | Stop reason: HOSPADM

## 2020-02-20 RX ORDER — AMOXICILLIN 250 MG
1-2 CAPSULE ORAL 2 TIMES DAILY
Status: DISCONTINUED | OUTPATIENT
Start: 2020-02-20 | End: 2020-02-21 | Stop reason: HOSPADM

## 2020-02-20 RX ORDER — DICYCLOMINE HCL 20 MG
20 TABLET ORAL EVERY 6 HOURS PRN
Status: ON HOLD | COMMUNITY
Start: 2019-12-30 | End: 2021-01-14

## 2020-02-20 RX ORDER — NALOXONE HYDROCHLORIDE 0.4 MG/ML
.1-.4 INJECTION, SOLUTION INTRAMUSCULAR; INTRAVENOUS; SUBCUTANEOUS
Status: DISCONTINUED | OUTPATIENT
Start: 2020-02-20 | End: 2020-02-21 | Stop reason: HOSPADM

## 2020-02-20 RX ORDER — CYCLOBENZAPRINE HCL 10 MG
10 TABLET ORAL 2 TIMES DAILY PRN
COMMUNITY
Start: 2020-02-03

## 2020-02-20 RX ORDER — ONDANSETRON 2 MG/ML
4 INJECTION INTRAMUSCULAR; INTRAVENOUS EVERY 30 MIN PRN
Status: DISCONTINUED | OUTPATIENT
Start: 2020-02-20 | End: 2020-02-20

## 2020-02-20 RX ORDER — POLYETHYLENE GLYCOL 3350 17 G/17G
17 POWDER, FOR SOLUTION ORAL DAILY PRN
Status: DISCONTINUED | OUTPATIENT
Start: 2020-02-20 | End: 2020-02-21 | Stop reason: HOSPADM

## 2020-02-20 RX ORDER — SODIUM CHLORIDE 9 MG/ML
1000 INJECTION, SOLUTION INTRAVENOUS CONTINUOUS
Status: DISCONTINUED | OUTPATIENT
Start: 2020-02-20 | End: 2020-02-20

## 2020-02-20 RX ADMIN — ONDANSETRON 4 MG: 2 INJECTION INTRAMUSCULAR; INTRAVENOUS at 16:38

## 2020-02-20 RX ADMIN — ATORVASTATIN CALCIUM 20 MG: 20 TABLET, FILM COATED ORAL at 22:23

## 2020-02-20 RX ADMIN — ASPIRIN 81 MG: 81 TABLET, COATED ORAL at 22:23

## 2020-02-20 RX ADMIN — ACETAMINOPHEN 1000 MG: 500 TABLET, FILM COATED ORAL at 19:45

## 2020-02-20 RX ADMIN — IOPAMIDOL 90 ML: 755 INJECTION, SOLUTION INTRAVENOUS at 17:39

## 2020-02-20 RX ADMIN — SODIUM CHLORIDE: 9 INJECTION, SOLUTION INTRAVENOUS at 22:23

## 2020-02-20 RX ADMIN — SODIUM CHLORIDE 1000 ML: 9 INJECTION, SOLUTION INTRAVENOUS at 16:39

## 2020-02-20 RX ADMIN — SODIUM CHLORIDE 62 ML: 9 INJECTION, SOLUTION INTRAVENOUS at 17:39

## 2020-02-20 ASSESSMENT — ENCOUNTER SYMPTOMS
NAUSEA: 1
HEMATURIA: 0
BLOOD IN STOOL: 0
ABDOMINAL PAIN: 1
ABDOMINAL DISTENTION: 0
VOMITING: 1
DYSURIA: 0
DIARRHEA: 1
FEVER: 0

## 2020-02-20 ASSESSMENT — MIFFLIN-ST. JEOR: SCORE: 1493.64

## 2020-02-20 NOTE — ED PROVIDER NOTES
History     Chief Complaint:  Dizziness and Abdominal Pain    HPI  Myke Fraire is a 74 year old male who presents to the emergency department for evaluation of dizziness and abdominal pain. The patient reports nausea, vomiting, diarrhea, and lower abdominal pain that all began at 0400 this morning. There has not been any blood in either his vomit or diarrhea. The patient also reports a room spinning dizziness that began this morning. He denies light headedness or any weakness/numbness in any of his limbs. 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       Allergies:  No known drug allergies    Medications:    cyclobenzaprine   dicyclomine     Past Medical History:    Infectious colitis  Enteritis  Gastroenteritis  BPH     Past Surgical History:    Cholecystectomy    Family History:    Cancer    Social History:  The patient arrives with wife  Smoking: never  Alcohol use: yes  PCP: Bi Suarez  Marital Status:  [2]      Review of Systems   Constitutional: Negative for fever.   Gastrointestinal: Positive for abdominal pain, diarrhea, nausea and vomiting. Negative for abdominal distention and blood in stool.   Genitourinary: Negative for dysuria and hematuria.   All other systems reviewed and are negative.      Physical Exam     Patient Vitals for the past 24 hrs:   BP Temp Temp src Pulse Heart Rate Resp SpO2 Weight   02/20/20 1905 (!) 159/99 -- -- 81 -- -- 98 % --   02/20/20 1700 -- -- -- -- -- -- 93 % --   02/20/20 1645 130/83 -- -- 84 -- -- 93 % --   02/20/20 1608 (!) 142/89 99.2  F (37.3  C) Oral -- 91 18 97 % 81.3 kg (179 lb 3.7 oz)     Physical Exam  Constitutional: Patient is well appearing. No distress.  Head: Atraumatic.  Mouth/Throat: Oropharynx is clear and moist. No oropharyngeal exudate.  Eyes: Conjunctivae and EOM are normal. No scleral icterus.  Neck: Normal range of motion. Neck supple.   Cardiovascular: Normal  rate, regular rhythm, normal heart sounds and intact distal pulses.   Pulmonary/Chest: Breath sounds normal. No respiratory distress.  Abdominal: Soft. Bowel sounds are normal. No distension. No tenderness. No rebound or guarding.   Musculoskeletal: Normal range of motion. No edema or tenderness.   Neurological: Alert and orientated to person, place, and time. No observable focal neuro deficit  Skin: Warm and dry. No rash noted. Not diaphoretic.     Emergency Department Course     ECG:  ECG taken at 1652, ECG read at 1655  Normal sinus rhythm  Possible left atrial enlargement  Left anterior fascicular block  Abnormal ECG  Rate 83 bpm. NC interval 150 ms. QRS duration 90 ms. QT/QTc 366/430 ms. P-R-T axes 36 -53 38.    Imaging:  Radiology findings were communicated with the patient who voiced understanding of the findings.    MR brain wo contrast   IMPRESSION:  1.  Suspect tiny 4 mm lacunar infarct in the right centrum semiovale, likely acute to subacute.   2.  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  3.  Mild inflammatory paranasal sinus disease  Reading per radiology    XR Chest 2 views   IMPRESSION: Negative chest  Reading per radiology    CT Abdomen Pelvis w contrast   IMPRESSION:   1.  No acute abnormality in the abdomen or pelvis. Moderate amount of fluid in the distal small bowel and colon consistent with history of diarrheal illness  Reading per radiology    Laboratory:  Laboratory findings were communicated with the patient who voiced understanding of the findings.    CBC: WBC 13.0 (H), HGB 18.1 (H) o/w WNL. ( )   CMP: Glucose 99, bilirubin 1.6 (H), o/w WNL (Creatinine: 1.07)    Lipase: 66 (L)    Troponin (Collected 1617): <0.015    Influenza A/B Antigen: negative    Interventions:  1638 Zofran 4mg IV injection   1639 NS 1L IV Bolus    Emergency Department Course:  Past medical records, nursing notes, and vitals reviewed.  1613: I  performed an exam of the patient and obtained history, as documented above.     IV was inserted and blood was drawn for laboratory testing, results above.  The patient was sent for a CT, XR, and MR while in the emergency department, findings above    1842: I rechecked the patient. Explained findings to patient.    I personally reviewed the laboratory and imaging results with the Patient and answered all related questions prior to admission.     Findings and plan explained to the Patient who consents to admission. Discussed the patient with Dr. Dukes, who will admit the patient to an observation bed for further monitoring, evaluation, and treatment.  Impression & Plan      Medical Decision Making:  Myke Fraire is a 74 year old male who presents to the emergency department today for evaluation of for multiple complaints mainly related to neuro and GI.      GI/abd workup as above and exams very reassuring.    Discussed MRI findings with stroke neuro admit for further workup and mgmt.      Diagnosis:    ICD-10-CM   1. Lacunar infarct, acute (H) I63.81   2. Gastroenteritis K52.9       Disposition:   Admitted to hospitalist      Scribe Disclosure:  I, Cassandra Streeter, am serving as a scribe at 4:13 PM on 2/20/2020 to document services personally performed by Fransisco Wyatt MD based on my observations and the provider's statements to me.    [facility] EMERGENCY DEPARTMENT       Fransisco Wyatt MD  02/20/20 7740

## 2020-02-20 NOTE — LETTER
February 21, 2020      Myke Fraire  [address] [id number]  [place] [address]        To Whom It May Concern:    Myke Fraire was seen on 2/20/20200 to 2/21/2020.  Please excuse him due to illness.        Sincerely,          Katy Santizo PA-C

## 2020-02-20 NOTE — ED TRIAGE NOTES
Pt reports woke with dizziness, N/V/D at 4am and has been feeling dizzy throughout the day. Also c/o mid abdominal pain. Per wife, pt was dx with benign tumor on pancreas in December and is taking Bentyl for abdominal pain every 6hrs.  Pt ABCs intact A&Ox4.

## 2020-02-21 ENCOUNTER — APPOINTMENT (OUTPATIENT)
Dept: CARDIOLOGY | Facility: CLINIC | Age: 75
End: 2020-02-21
Attending: PHYSICIAN ASSISTANT
Payer: COMMERCIAL

## 2020-02-21 ENCOUNTER — OFFICE VISIT (OUTPATIENT)
Dept: INTERPRETER SERVICES | Facility: CLINIC | Age: 75
End: 2020-02-21
Payer: COMMERCIAL

## 2020-02-21 ENCOUNTER — APPOINTMENT (OUTPATIENT)
Dept: ULTRASOUND IMAGING | Facility: CLINIC | Age: 75
End: 2020-02-21
Attending: PHYSICIAN ASSISTANT
Payer: COMMERCIAL

## 2020-02-21 VITALS
OXYGEN SATURATION: 98 % | RESPIRATION RATE: 16 BRPM | TEMPERATURE: 95.9 F | HEART RATE: 74 BPM | DIASTOLIC BLOOD PRESSURE: 75 MMHG | HEIGHT: 65 IN | SYSTOLIC BLOOD PRESSURE: 136 MMHG | BODY MASS INDEX: 30.02 KG/M2 | WEIGHT: 180.2 LBS

## 2020-02-21 LAB
CHOLEST SERPL-MCNC: 134 MG/DL
HBA1C MFR BLD: 5.3 % (ref 0–5.6)
HDLC SERPL-MCNC: 44 MG/DL
INTERPRETATION ECG - MUSE: NORMAL
LDLC SERPL CALC-MCNC: 76 MG/DL
NONHDLC SERPL-MCNC: 90 MG/DL
TRIGL SERPL-MCNC: 69 MG/DL

## 2020-02-21 PROCEDURE — G0378 HOSPITAL OBSERVATION PER HR: HCPCS

## 2020-02-21 PROCEDURE — 93306 TTE W/DOPPLER COMPLETE: CPT | Mod: 26 | Performed by: INTERNAL MEDICINE

## 2020-02-21 PROCEDURE — 93880 EXTRACRANIAL BILAT STUDY: CPT

## 2020-02-21 PROCEDURE — 36415 COLL VENOUS BLD VENIPUNCTURE: CPT | Performed by: PHYSICIAN ASSISTANT

## 2020-02-21 PROCEDURE — 25800025 ZZH RX 258: Performed by: HOSPITALIST

## 2020-02-21 PROCEDURE — 25000132 ZZH RX MED GY IP 250 OP 250 PS 637: Performed by: PSYCHIATRY & NEUROLOGY

## 2020-02-21 PROCEDURE — 83036 HEMOGLOBIN GLYCOSYLATED A1C: CPT | Performed by: PHYSICIAN ASSISTANT

## 2020-02-21 PROCEDURE — T1013 SIGN LANG/ORAL INTERPRETER: HCPCS | Mod: U3

## 2020-02-21 PROCEDURE — 99217 ZZC OBSERVATION CARE DISCHARGE: CPT | Performed by: PHYSICIAN ASSISTANT

## 2020-02-21 PROCEDURE — 99207 ZZC NO BILLABLE SERVICE THIS VISIT: CPT | Performed by: PSYCHIATRY & NEUROLOGY

## 2020-02-21 PROCEDURE — 25000132 ZZH RX MED GY IP 250 OP 250 PS 637: Performed by: PHYSICIAN ASSISTANT

## 2020-02-21 PROCEDURE — 87506 IADNA-DNA/RNA PROBE TQ 6-11: CPT | Performed by: PHYSICIAN ASSISTANT

## 2020-02-21 PROCEDURE — 40000264 ECHOCARDIOGRAM COMPLETE

## 2020-02-21 PROCEDURE — 80061 LIPID PANEL: CPT | Performed by: PHYSICIAN ASSISTANT

## 2020-02-21 RX ORDER — CLOPIDOGREL BISULFATE 75 MG/1
75 TABLET ORAL DAILY
Status: DISCONTINUED | OUTPATIENT
Start: 2020-02-22 | End: 2020-02-21 | Stop reason: HOSPADM

## 2020-02-21 RX ORDER — ATORVASTATIN CALCIUM 20 MG/1
20 TABLET, FILM COATED ORAL EVERY EVENING
Qty: 30 TABLET | Refills: 0 | Status: SHIPPED | OUTPATIENT
Start: 2020-02-21

## 2020-02-21 RX ORDER — CLOPIDOGREL BISULFATE 75 MG/1
75 TABLET ORAL DAILY
Qty: 21 TABLET | Refills: 0 | Status: SHIPPED | OUTPATIENT
Start: 2020-02-22

## 2020-02-21 RX ORDER — CLOPIDOGREL BISULFATE 75 MG/1
300 TABLET ORAL ONCE
Status: COMPLETED | OUTPATIENT
Start: 2020-02-21 | End: 2020-02-21

## 2020-02-21 RX ORDER — ACYCLOVIR 200 MG/1
30 CAPSULE ORAL ONCE
Status: COMPLETED | OUTPATIENT
Start: 2020-02-21 | End: 2020-02-21

## 2020-02-21 RX ORDER — ACETAMINOPHEN 325 MG/1
650 TABLET ORAL EVERY 4 HOURS PRN
Status: DISCONTINUED | OUTPATIENT
Start: 2020-02-21 | End: 2020-02-21 | Stop reason: HOSPADM

## 2020-02-21 RX ADMIN — ACETAMINOPHEN 650 MG: 325 TABLET, FILM COATED ORAL at 00:35

## 2020-02-21 RX ADMIN — SODIUM CHLORIDE 30 ML: 9 INJECTION, SOLUTION INTRAMUSCULAR; INTRAVENOUS; SUBCUTANEOUS at 08:06

## 2020-02-21 RX ADMIN — ASPIRIN 81 MG: 81 TABLET, COATED ORAL at 09:30

## 2020-02-21 RX ADMIN — DICYCLOMINE HYDROCHLORIDE 20 MG: 20 TABLET ORAL at 09:30

## 2020-02-21 RX ADMIN — CLOPIDOGREL BISULFATE 300 MG: 75 TABLET ORAL at 11:19

## 2020-02-21 RX ADMIN — ACETAMINOPHEN 650 MG: 325 TABLET, FILM COATED ORAL at 04:57

## 2020-02-21 NOTE — PLAN OF CARE
"PRIMARY DIAGNOSIS: TIA, Gastroenteritis   OUTPATIENT/OBSERVATION GOALS TO BE MET BEFORE DISCHARGE:  1. Orthostatic performed: Yes:          Lying Orthostatic BP: 106/57         Sitting Orthostatic BP: 108/64         Standing Orthostatic BP: 99/59      2. Diagnostic testing complete & at baseline neurologic testing: Yes     3. Cleared by consultants (if involved): No     4. Interpretation of cardiac rhythm per telemetry tech: SR SB     5. Tolerating adequate PO diet and medications: clears     6. Return to near baseline physical activity or neurologic status: Yes     Discharge Planner Nurse   Safe discharge environment identified: Yes  Barriers to discharge: Yes       Entered by: Wilberto East 02/21/2020     Pt alert and oriented x4. Having abdominal cramps, given bentyl. Denies dizziness at this time. Denies nausea. He is having diarrhea today. Neuro's intact. Tele on SR, SB. Enteric precautions. Echo and US done this AM. Tele stroke @ 10 am. Clear liquid diet. Up sba. NS @ 100 ml/hr. Started Plavix. Will go home this afternoon.     /75 (BP Location: Left arm)   Pulse 74   Temp 95.9  F (35.5  C) (Oral)   Resp 16   Ht 1.65 m (5' 4.96\")   Wt 81.7 kg (180 lb 3.2 oz)   SpO2 98%   BMI 30.02 kg/m       Please review provider order for any additional goals.   Nurse to notify provider when observation goals have been met and patient is ready for discharge.              "

## 2020-02-21 NOTE — ED NOTES
[facility]  ED Nurse Handoff Report    Myke Fraire is a 74 year old male   ED Chief complaint: Dizziness and Abdominal Pain  . ED Diagnosis:   Final diagnoses:   Lacunar infarct, acute (H)   Gastroenteritis     Allergies:   Allergies   Allergen Reactions     Nkda [No Known Drug Allergies]      No Known Allergies        Code Status: Full Code  Activity level - Baseline/Home:  Independent. Activity Level - Current:   Independent. Lift room needed: No. Bariatric: No   Needed: No   Isolation: No. Infection: Not Applicable.     Vital Signs:   Vitals:    02/20/20 1645 02/20/20 1700 02/20/20 1905 02/20/20 1930   BP: 130/83  (!) 159/99 (!) 157/98   Pulse: 84  81 81   Resp:       Temp:       TempSrc:       SpO2: 93% 93% 98% 97%   Weight:           Cardiac Rhythm:  ,      Pain level:    Patient confused: No. Patient Falls Risk: Yes.   Elimination Status: Has voided   Patient Report - Initial Complaint: Dizziness, Abdominal Pain. Focused Assessment: Pt presents with c/o dizziness that woke pt from sleep at 4am with N/V/D. Pt denies bloody emesis/stools. Pt also diagnosed with benign pancreatic tumor in December, taking Bentyl q6h. Reports abdominal pain is worsening. ABCs intact A&Ox4.   Tests Performed:   Labs Ordered and Resulted from Time of ED Arrival Up to the Time of Departure from the ED   CBC WITH PLATELETS DIFFERENTIAL - Abnormal; Notable for the following components:       Result Value    WBC 13.0 (*)     Hemoglobin 18.1 (*)     Hematocrit 55.2 (*)     Absolute Neutrophil 11.6 (*)     Absolute Lymphocytes 0.4 (*)     All other components within normal limits   LIPASE - Abnormal; Notable for the following components:    Lipase 66 (*)     All other components within normal limits   COMPREHENSIVE METABOLIC PANEL - Abnormal; Notable for the following components:    Bilirubin Total 1.6 (*)     All other components within normal limits   TROPONIN I   INFLUENZA A/B ANTIGEN     XR Chest 2 Views    Final Result   IMPRESSION: Negative chest.      CT Abdomen Pelvis w Contrast   Final Result   IMPRESSION:    1.  No acute abnormality in the abdomen or pelvis. Moderate amount of fluid in the distal small bowel and colon consistent with history of diarrheal illness.         MR Brain w/o Contrast   Final Result   IMPRESSION:   1.  Suspect tiny 4 mm lacunar infarct in the right centrum semiovale, likely acute to subacute.    2.  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   3.  Mild inflammatory paranasal sinus disease.      Findings were discussed with Dr. Fransisco Wyatt at 1747 hours on 02/20/2020.        Treatments provided: IVF, IV zofran, PO Tylenol  Family Comments: Wife at bedside  OBS brochure/video discussed/provided to patient:  Yes  ED Medications:   Medications   0.9% sodium chloride BOLUS (0 mLs Intravenous Stopped 2/20/20 1900)     Followed by   sodium chloride 0.9% infusion (has no administration in time range)   ondansetron (ZOFRAN) injection 4 mg (4 mg Intravenous Given 2/20/20 1638)   0.9% sodium chloride BOLUS (0 mLs Intravenous Stopped 2/20/20 1900)   iopamidol (ISOVUE-370) solution 500 mL (90 mLs Intravenous Given 2/20/20 1739)     Drips infusing:  No  For the majority of the shift, the patient's behavior Green. Interventions performed were none.    Sepsis treatment initiated: No       ED Nurse Name/Phone Number: Sunni Martins RN,   7:37 PM    RECEIVING UNIT ED HANDOFF REVIEW    Above ED Nurse Handoff Report was reviewed: Yes  Reviewed by: Emerson Hernandez on February 20, 2020 at 8:38 PM

## 2020-02-21 NOTE — CONSULTS
[facility]    Stroke Consult Note    Reason for Consult:  Incidental r centrum ovale infarct     Chief Complaint: Dizziness and Abdominal Pain       HPI  Myke Fraire is a 74 year old male no significant PMHx presents with dizziness and diarrhea.  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 Stroke team was consulted for management.  Denies any focal weakness or numbness.. No slurred speech or word finding issues or facial droop.    No risk factors except for possible evaluation for sleep apnea. Was not on any antiplatelets at home. Only takes flexeril for foot pain.   Denies any tobacco or alcohol use        Stroke Evaluation summarized:  MRI/Head CT: R centrum ovale infarct  Carotid US: negative for any significant stenosis  Echocardiogram: read pending   LDL: 76, start lipitor 20 mg qday   A1c: pending          PHQ-2 Depression Screening  Over the last 2 weeks, how many days have you noted: Never   (0 points) Several  (1 point) More than half  (2 points) Nearly all  (3 points)   Little interest/pleasure in doing things?   0 1 0 0   Feeling down, depressed, or hopeless? 0 1 0 0     PHQ-2 depression score: 2, consistent with a negative screen for depression.        Impression  Ischemic Stroke due to small-vessel occlusion    74 year old male no significant PMHx presents with dizziness and diarrhea.  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 Stroke team was consulted for management.    Recommendations  Acute Ischemic Stroke (without tPA) Recommendations  - Permissive HTN; labetalol PRN for SBP > 220  [] ASA 81 mg qday, plavix 300 mg once, then plavix 75 mg qday X 3 weeks, then  mg monotherapy  [] Statin: lipitor 20 mg, LDL was 76, goal < 70   [] Echo w/ bubble is pending  [] A1c pending, goal < 6 %  - Telemetry, EKG  - Bedside Glucose Monitoring  - A1c, Lipid Panel, Troponin x 3  - PT/OT/SLP  - PM&R  -  "Stroke Education, PLC   - Euthermia, Euglycemia  [] Follow up with N neurology 4-6 weeks post discharge       Genesis Haley MD  Vascular Neurology Fellow  02/21/2020 10:36 AM    To page stroke neurology, click here: [facility]  Choose \"On Call\" tab at top, then search dropdown box for \"Neurology Adult\" & press Enter, look for Neuro ICU/Stroke  _____________________________________________________    Past Medical History   History reviewed. No pertinent past medical history.  Past Surgical History   Past Surgical History:   Procedure Laterality Date     C NONSPECIFIC PROCEDURE      disk surgery, lumbar spine     HC COLONOSCOPY THRU STOMA, DIAGNOSTIC  2005    normal - repeat in 10 years     HC REMOVAL GALLBLADDER      Cholecystectomy     UPPER GI ENDOSCOPY  5/2005    gastitis and duodenitis     UPPER GI ENDOSCOPY  2/2008    gastritis and duodenitis     Medications   Home Meds  Prior to Admission medications    Medication Sig Start Date End Date Taking? Authorizing Provider   cyclobenzaprine 10 MG PO tablet Take 10 mg by mouth 2 times daily as needed for muscle spasms  2/3/20  Yes Reported, Patient   dicyclomine 20 MG PO tablet Take 20 mg by mouth every 6 hours as needed (cramping)  12/30/19  Yes Reported, Patient   ibuprofen (ADVIL) 200 MG capsule Take 200-400 mg by mouth every 6 hours as needed for pain or fever With meals, snack 11/19/12  Yes Jelani Ho MD   polyethylene glycol PO packet Take 17 g by mouth daily as needed for constipation    Yes Reported, Patient       Scheduled Meds    aspirin  81 mg Oral Daily     atorvastatin  20 mg Oral QPM     senna-docusate  1-2 tablet Oral BID       Infusion Meds    sodium chloride 100 mL/hr at 02/20/20 2223       PRN Meds  acetaminophen, dicyclomine, naloxone, polyethylene glycol    Allergies   Allergies   Allergen Reactions     Nkda [No Known Drug Allergies]      No Known Allergies      Family History   Family History   Problem Relation Age of Onset     Cancer Mother         " stomach cancer     Cancer Father         throat     Social History   Social History     Tobacco Use     Smoking status: Never Smoker     Smokeless tobacco: Never Used   Substance Use Topics     Alcohol use: Yes     Comment: occasionally     Drug use: No       Review of Systems   The 10 point Review of Systems is negative other than noted in the HPI or here.        PHYSICAL EXAMINATION   Temp:  [98.2  F (36.8  C)-100.3  F (37.9  C)] 99  F (37.2  C)  Pulse:  [74-88] 74  Heart Rate:  [65-91] 65  Resp:  [16-20] 18  BP: (106-159)/(57-99) 116/67  SpO2:  [93 %-98 %] 96 %       Neurologic  Mental Status:  alert, oriented x 3, follows commands, speech clear and fluent, naming and repetition normal  Cranial Nerves:  visual fields intact, EOMI with normal smooth pursuit, hearing not formally tested but intact to conversation, no dysarthria, shoulder shrug equal bilaterally, tongue protrusion midline, no facial droop. Sensory equal bilaterally in v/1/2/3 distribution  Motor:  no abnormal movements, able to move all limbs antigravity spontaneously with no signs of hemiparesis observed, no pronator drift  Reflexes:  deferred  Sensory:  sensation decrease to light touch on the left body, no extinction  Coordination:  normal finger-to-nose   Station/Gait:  deferred     Dysphagia Screen  Per Nursing         Stroke Scales    NIHSS  Interval baseline (02/21/20 1042)   Interval Comments     1a. Level of Consciousness 0-->Alert, keenly responsive   1b. LOC Questions 0-->Answers both questions correctly   1c. LOC Commands 0-->Performs both tasks correctly   2.   Best Gaze 0-->Normal   3.   Visual 0-->No visual loss   4.   Facial Palsy 0-->Normal symmetrical movements   5a. Motor Arm, Left 0-->No drift, limb holds 90 (or 45) degrees for full 10 secs   5b. Motor Arm, Right 0-->No drift, limb holds 90 (or 45) degrees for full 10 secs   6a. Motor Leg, Left 0-->No drift, leg holds 30 degree position for full 5 secs   6b. Motor Leg, right  0-->No drift, leg holds 30 degree position for full 5 secs   7.   Limb Ataxia 0-->Absent   8.   Sensory 0-->Normal, no sensory loss   9.   Best Language 0-->No aphasia, normal   10. Dysarthria 0-->Normal   11. Extinction and Inattention  0-->No abnormality   Total 0 (02/21/20 1042)       Imaging  I personally reviewed all imaging; relevant findings per HPI.    Labs Data   CBC  Recent Labs   Lab 02/20/20  1617   WBC 13.0*   RBC 5.87   HGB 18.1*   HCT 55.2*        Basic Metabolic Panel   Recent Labs   Lab 02/20/20  1617      POTASSIUM 3.7   CHLORIDE 107   CO2 26   BUN 21   CR 1.07   GLC 99   ROBBIE 8.5     Liver Panel  Recent Labs   Lab Test 02/20/20  1617 11/19/12  1536   PROTTOTAL 7.8 7.1   ALBUMIN 4.3 4.2   BILITOTAL 1.6* 1.1   ALKPHOS 104 91   AST 19 27   ALT 31 34     INRNo lab results found.   Lipid Profile  Recent Labs   Lab Test 02/21/20  0650 11/19/12  1536   CHOL 134 134   HDL 44 37*   LDL 76 82   TRIG 69 73   CHOLHDLRATIO  --  3.6     A1CNo lab results found.  Troponin I  Recent Labs   Lab 02/20/20  1617   TROPI <0.015          Stroke Code / Stroke Consult Data Data     Telestroke Service Details  Type of service telemedicine diagnostic assessment of acute neurological changes   Reason telemedicine is appropriate patient requires assessment with a specialist for diagnosis and treatment of neurological symptoms   Mode of transmission secure interactive audio and video communication per Mary   Originating site (patient location) [facility]    Distant site (provider location) [facility]

## 2020-02-21 NOTE — PLAN OF CARE
"PRIMARY DIAGNOSIS: TIA, Gastroenteritis   OUTPATIENT/OBSERVATION GOALS TO BE MET BEFORE DISCHARGE:  1. Orthostatic performed: Yes:          Lying Orthostatic BP: 106/57         Sitting Orthostatic BP: 108/64         Standing Orthostatic BP: 99/59     2. Diagnostic testing complete & at baseline neurologic testing: Yes    3. Cleared by consultants (if involved): No    4. Interpretation of cardiac rhythm per telemetry tech: SR SB    5. Tolerating adequate PO diet and medications: clears    6. Return to near baseline physical activity or neurologic status: Yes    Discharge Planner Nurse   Safe discharge environment identified: Yes  Barriers to discharge: Yes       Entered by: Wilberto East 02/21/2020    Pt alert and oriented x4. Having abdominal cramps, given bentyl. Denies dizziness at this time. Denies nausea. He is having diarrhea this am. Neuro's intact. Tele on SR, SB. Enteric precautions. Echo and US done this AM. Tele stroke @ 10 am. Clear liquid diet. Up sba. NS @ 100 ml/hr.     /67 (BP Location: Left arm)   Pulse 74   Temp 99  F (37.2  C) (Oral)   Resp 18   Ht 1.65 m (5' 4.96\")   Wt 81.7 kg (180 lb 3.2 oz)   SpO2 96%   BMI 30.02 kg/m         Please review provider order for any additional goals.   Nurse to notify provider when observation goals have been met and patient is ready for discharge.  "

## 2020-02-21 NOTE — PLAN OF CARE
ROOM # 229    Living Situation (if not independent, order SW consult): Apt w/family  Facility name:  : Kaylynn, daughter  Nelly, wife    Activity level at baseline: Ind  Activity level on admit: SBA      Patient registered to observation; given Patient Bill of Rights; given the opportunity to ask questions about observation status and their plan of care.  Patient has been oriented to the observation room, bathroom and call light is in place.    Discussed discharge goals and expectations with patient/family.

## 2020-02-21 NOTE — PLAN OF CARE
PRIMARY DIAGNOSIS: CVA, N/V/D  OUTPATIENT/OBSERVATION GOALS TO BE MET BEFORE DISCHARGE:  1. Orthostatic performed: Need to obtain    2. Diagnostic testing complete & at baseline neurologic testing: MRI brain completed. Plan for ECHO and US bilateral carotid in AM    3. Cleared by consultants (if involved): No. Neuro Stroke and Patient Learning center for stroke    4. Interpretation of cardiac rhythm per telemetry tech: SR w/HR in 80s    5. Tolerating adequate PO diet and medications: Yes    6. Return to near baseline physical activity or neurologic status: Yes, SBA for safety    Discharge Planner Nurse   Safe discharge environment identified: Yes  Barriers to discharge: Yes, consult and workup       Entered by: Emerson Hernandez 02/21/2020 1:04 AM    Vitals are Temp: 100  F (37.8  C) Temp src: Oral BP: 121/68 Pulse: 74 Heart Rate: 84 Resp: 16 SpO2: 93 %.  Patient is Alert and Oriented x4.  requested for tomorrow per pt's request. Up SBA.   Patient is on Enteric precuations for diarrhea and still needing stool sample.   On Clear liquid diet. Patient has Normal Saline 0.9% running at 100 mL per hour. Denies dizziness, SOB, and nausea. Neuros intact. LS clear. BS active. Reported watery stool in ED. Pain left of umbilicus when palpitated, otherwise denies pain. Baseline tingling to L. Foot. Stable and resting in bed. Will continue to monitor and provide cares.       Please review provider order for any additional goals.   Nurse to notify provider when observation goals have been met and patient is ready for discharge.

## 2020-02-21 NOTE — DISCHARGE SUMMARY
[facility] Outpatient / Observation Unit  Discharge Summary        Myke Fraire MRN# [id number]   YOB: 1945 Age: 74 year old     Date of Admission:  2/20/2020  Date of Discharge:  2/21/2020  Admitting Physician:  Bill Dukes MD  Discharge Physician: Katy Santizo PA-C  Discharging Service: Hospitalist      Primary Provider: Bi Suarez  Primary Care Physician Phone Number: [phone]         Primary Discharge Diagnoses:    Myke Fraire was admitted on 2/20/2020 with complaints of nausea, vomiting and diarrhea consistent with gastroenteritis. Pt also had dizziness so an MRI was obtained which was concerning for possible acute/subacute infarct and chronic infarcts.     1. Acute gastroenteritis - likely viral process. Enteric panel still pending. Continues to have diarrhea, vomiting resolved, tolerating oral intake. Continue supportive cares at home  2. Poss acute CVA - MRI obtained due to dizziness. Stroke neurology consulted, felt area read as acute infarct was not actually an infarct but given he has chronic infarcts, complete work up obtained. Echocardiogram was mildly positive for PFO, otherwise unremarkable. Carotid US was negative for significant stenosis. He was started on baby aspirin and statin. Stroke neuro recommend dual antiplatelet therapy for 3 weeks so Plavix also started. Follow up with Neurology in 1 month, discuss PFO at that time.            Secondary Discharge Diagnoses:   History reviewed. No pertinent past medical history.  Benign pancreatic lesion            Code Status:      Full Code        Brief Hospital Summary:        Reason for your hospital stay      Nausea, vomiting and diarrhea likely due to viral gastroenteritis. MRI of   the brain was done due to dizziness and showed a possible acute infarct   and old infarcts. Stroke neurology was consulted and questioned the acute   infarct but given the old infarcts, recommend aspirin and Plavix for 3   weeks  then aspirin alone, as well as starting a statin. Vomiting resolved   and diarrhea is stable. You tolerated oral intake.             Please refer to initial admission history and physical for further details.   Briefly, Myke Fraire was admitted on 2/20/2020 for complaints of nausea, vomiting and diarrhea, as well as dizziness.     Initial work up in the ED revealed fairly unremarkable labs other than an elevated WBC of 13.0. Due to the dizziness, an MRI of the brain was obtained that did show possible tiny acute/subacute lacunar infarct and small chronic infarcts.    EKG did not reveal evidence of significant cardiac arrhythmias or ischemia.  Pt was registered to the Observation Unit for further evaluation.     Pt was placed on telemetry and underwent frequent neuro checks.   Pt was anticoagulated with Aspirin EC 81 mg po daily.  Laboratory results were reviewed and significant findings addressed.   ECHO with bubble was performed as well as carotid US (with results listed below).   Neurology consult was obtained. They had low suspicion that area read as an acute infarct was actually acute but given chronic infarcts, complete work up recommended. He was started on a statin and Plavix in addition to ASA.  On the day of discharge, patient had improvement of the symptoms, telemetry did not reveal any significant arrhythmias, vitals remained stable and pt was deemed safe for discharge. He tolerated oral intake, he did not have any further vomiting and diarrhea was stable. Medications were reviewed and adjustments made as necessary. Pt is instructed to follow up as below.           Significant Lab During Hospitalization:      Recent Labs   Lab 02/20/20  1617   WBC 13.0*   HGB 18.1*   HCT 55.2*   MCV 94        Recent Labs   Lab 02/20/20  1617      POTASSIUM 3.7   CHLORIDE 107   CO2 26   ANIONGAP 4   GLC 99   BUN 21   CR 1.07   GFRESTIMATED 69   GFRESTBLACK 80   ROBBIE 8.5   PROTTOTAL 7.8   ALBUMIN 4.3    BILITOTAL 1.6*   ALKPHOS 104   AST 19   ALT 31     Recent Labs   Lab 02/20/20  1617   LIPASE 66*     Recent Labs   Lab 02/21/20  0650   CHOL 134   HDL 44   LDL 76   TRIG 69     Recent Labs   Lab 02/20/20  1617   TROPI <0.015                Significant Imaging During Hospitalization:      Recent Results (from the past 48 hour(s))   MR Brain w/o Contrast    Narrative    EXAM: MR BRAIN W/O CONTRAST  LOCATION: [facility]  DATE/TIME: 2/20/2020 5:05 PM    INDICATION: Vertigo  COMPARISON: None.  TECHNIQUE: Routine multiplanar multisequence head MRI without intravenous contrast.    FINDINGS:  INTRACRANIAL CONTENTS: Suspect a tiny acute to subacute infarct in the right centrum semiovale measuring approximately 4 mm (series 2.2 image 78). Given small size, this is difficult to accurately characterize on ADC. Tiny chronic lacunar infarcts in the   bilateral cerebellar hemispheres. No mass, acute hemorrhage, or extra-axial fluid collections. Scattered nonspecific T2/FLAIR hyperintensities within the cerebral white matter most consistent with mild chronic microvascular ischemic change. Normal   ventricles and sulci. Normal position of the cerebellar tonsils.     SELLA: No abnormality accounting for technique.    OSSEOUS STRUCTURES/SOFT TISSUES: Normal marrow signal. The major intracranial vascular flow voids are maintained.     ORBITS: No abnormality accounting for technique.     SINUSES/MASTOIDS: Mild mucosal thickening scattered about the paranasal sinuses. No middle ear or mastoid effusion.       Impression    IMPRESSION:  1.  Suspect tiny 4 mm lacunar infarct in the right centrum semiovale, likely acute to subacute.   2.  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  3.  Mild inflammatory paranasal sinus disease.    Findings were discussed with Dr. Fransisco Wyatt at 1747 hours on 02/20/2020.   CT Abdomen Pelvis w Contrast    Narrative    EXAM: CT  ABDOMEN PELVIS W CONTRAST  LOCATION: [facility]  DATE/TIME: 2/20/2020 5:36 PM    INDICATION: Abdominal pain, nausea vomiting diarrhea  COMPARISON: 06/02/2000  TECHNIQUE: CT scan of the abdomen and pelvis was performed following injection of IV contrast. Multiplanar reformats were obtained. Dose reduction techniques were used.  CONTRAST: 90 mL Isovue-370    FINDINGS:   LOWER CHEST: Mild subsegmental atelectasis or scarring at the lung bases.    HEPATOBILIARY: Prior cholecystectomy. Liver unremarkable.    PANCREAS: Normal.    SPLEEN: Normal.    ADRENAL GLANDS: Normal.    KIDNEYS/BLADDER: Normal.    BOWEL: Moderate amount of fluid throughout the colon and distal small bowel loops but no evidence for obstruction. No free air or free fluid. Normal caliber appendix.    LYMPH NODES: Normal.    VASCULATURE: Unremarkable.    PELVIC ORGANS: Moderate prostate gland enlargement.    MUSCULOSKELETAL: Normal.      Impression    IMPRESSION:   1.  No acute abnormality in the abdomen or pelvis. Moderate amount of fluid in the distal small bowel and colon consistent with history of diarrheal illness.     XR Chest 2 Views    Narrative    EXAM: XR CHEST 2 VW  LOCATION: [facility]  DATE/TIME: 2/20/2020 5:42 PM    INDICATION: Dizziness  COMPARISON: 01/28/2017      Impression    IMPRESSION: Negative chest.   US Carotid Bilateral    Narrative    US CAROTID BILATERAL 2/21/2020 7:33 AM    HISTORY: Cerebrovascular accident.    COMPARISON: None.    FINDINGS:     Right side:   On the grayscale images, no significant plaque identified.  Spectral waveform analysis was performed. Peak systolic and diastolic  velocities in the right internal carotid artery are 77 and 26 cm/s.  Per sonographic criteria, degree of stenosis in the right internal  carotid artery is less than 50%.  Flow in the right vertebral artery is antegrade.     Left side:   On the grayscale images, no significant plaque identified.  Spectral waveform  analysis was performed. Peak systolic and diastolic   velocities in the left internal carotid artery are 95 and 21 cm/s. Per  sonographic criteria, degree of stenosis in the left internal carotid  artery is less than 50%.  Flow in the left vertebral artery is antegrade.       Impression    IMPRESSION: Per sonographic criteria, there are less than 50% stenoses  in the internal carotid arteries bilaterally.    Degree of stenosis measured relative to the diameter of the normal  internal carotid artery per NASCET or NASCET type criteria    KATHIE DAN MD   Echocardiogram Complete    Narrative    [id number]  [id number]  [id number]  [id number]^ELIU^CONCHIS^RACHID           [facility]  Echocardiography Laboratory  [address] [place] [address]        Name: GAVI LAI  MRN: [id number]  : 1945  Study Date: 2020 07:47 AM  Age: 74 yrs  Gender: Male  Patient Location: [facility]  Reason For Study: TIA  Ordering Physician: CONCHIS NOWAK  Referring Physician: Bi Suarez  Performed By: Beto Mclaughlin RDCS     BSA: 1.9 m2  Height: 64 in  Weight: 180 lb  HR: 60  BP: 155/85 mmHg  _____________________________________________________________________________  __        Procedure  Complete Portable Bubble Echo Adult.  _____________________________________________________________________________  __        Interpretation Summary     Left ventricular size, global systolic function, and wall motion are normal,  estimated LVEF 55-60%.  Right ventricular global function is normal.  A contrast injection (Bubble Study) was performed that was mildly positive for  flow across the interatrial septum.  A patent foramen ovale is suspected.     There are no prior studies available for comparison.  _____________________________________________________________________________  __        Left Ventricle  The left ventricle is normal in size. There is concentric remodeling present.  Left ventricular systolic  function is normal. The visual ejection fraction is  estimated at 55-60%. Left ventricular diastolic function is normal. No  regional wall motion abnormalities noted.     Right Ventricle  The right ventricle is normal in structure, function and size.     Atria  Normal left atrial size. Right atrial size is normal. A contrast injection  (Bubble Study) was performed that was mildly positive for flow across the  interatrial septum. A patent foramen ovale is suspected.     Mitral Valve  The mitral valve is normal in structure and function.        Tricuspid Valve  The tricuspid valve is not well visualized, but is grossly normal.     Aortic Valve  The aortic valve is trileaflet. There is mild trileaflet aortic sclerosis.     Pulmonic Valve  The pulmonic valve is not well seen, but is grossly normal.     Vessels  Borderline aortic root dilatation. Normal size ascending aorta. The inferior  vena cava was normal in size with preserved respiratory variability.     Pericardium  There is no pericardial effusion.     _____________________________________________________________________________  __  MMode/2D Measurements & Calculations  IVSd: 1.1 cm  LVIDd: 4.7 cm  LVIDs: 2.7 cm  LVPWd: 1.1 cm  FS: 43.6 %  LV mass(C)d: 191.3 grams  LV mass(C)dI: 102.3 grams/m2     Ao root diam: 3.8 cm  LA dimension: 3.6 cm  asc Aorta Diam: 3.7 cm  LA/Ao: 0.94  LVOT diam: 2.5 cm  LVOT area: 5.0 cm2  LA Volume (BP): 68.9 ml  LA Volume Index (BP): 36.8 ml/m2  RWT: 0.46        Doppler Measurements & Calculations  MV E max brenda: 55.8 cm/sec  MV A max brenda: 63.1 cm/sec  MV E/A: 0.88  MV max P.8 mmHg  MV mean P.1 mmHg  MV V2 VTI: 27.9 cm  MVA(VTI): 3.6 cm2     MV dec time: 0.29 sec  LV V1 max P.2 mmHg  LV V1 max: 102.8 cm/sec  LV V1 VTI: 20.1 cm  CO(LVOT): 5.9 l/min  CI(LVOT): 3.2 l/min/m2  SV(LVOT): 100.0 ml  SI(LVOT): 53.5 ml/m2  PA acc time: 0.08 sec  TR max brenda: 256.9 cm/sec  TR max P.4 mmHg  E/E' av.6  Lateral E/e': 5.2  Medial  E/e': 8.0           _____________________________________________________________________________  __           Report approved by: Erika Pride 02/21/2020 11:47 AM                   Pending Results:        Unresulted Labs Ordered in the Past 30 Days of this Admission     Date and Time Order Name Status Description    2/20/2020 2234 Enteric Bacteria and Virus Panel by DONOVAN Stool In process               Consultations This Hospital Stay:      Consultation during this admission received from neurology         Discharge Instructions and Follow-Up:      Follow-up Appointments     Follow-up and recommended labs and tests       Follow up with primary care provider, Bi Suarez, within 7 days for   hospital follow- up.  No follow up labs or test are needed.    Follow up with Neurology in 1 month.  Take Plavix for 3 weeks with baby aspirin, then take full dose aspirin   alone.  Practice good hand hygiene at home           Pt instructed to follow up with PCP or Neurologist in 1 month  Follow-up Labs None        Discharge Disposition:      Discharged to home         Discharge Medications:        Current Discharge Medication List      START taking these medications    Details   aspirin 81 MG PO EC tablet Take 1 tablet (81 mg) by mouth daily  Qty: 30 tablet, Refills: 0    Associated Diagnoses: Cerebrovascular accident (CVA), unspecified mechanism (H)      atorvastatin 20 MG PO tablet Take 1 tablet (20 mg) by mouth every evening  Qty: 30 tablet, Refills: 0    Associated Diagnoses: Cerebrovascular accident (CVA), unspecified mechanism (H)      clopidogrel 75 MG PO tablet Take 1 tablet (75 mg) by mouth daily  Qty: 21 tablet, Refills: 0    Associated Diagnoses: Cerebrovascular accident (CVA), unspecified mechanism (H)         CONTINUE these medications which have NOT CHANGED    Details   cyclobenzaprine 10 MG PO tablet Take 10 mg by mouth 2 times daily as needed for muscle spasms       dicyclomine 20 MG PO tablet Take 20 mg by  "mouth every 6 hours as needed (cramping)       ibuprofen (ADVIL) 200 MG capsule Take 200-400 mg by mouth every 6 hours as needed for pain or fever With meals, snack  Qty: 120 capsule      polyethylene glycol PO packet Take 17 g by mouth daily as needed for constipation                  Allergies:         Allergies   Allergen Reactions     Nkda [No Known Drug Allergies]      No Known Allergies            Condition and Physical on Discharge:      Discharge condition: Stable   Vitals: Blood pressure 116/67, pulse 74, temperature 99  F (37.2  C), temperature source Oral, resp. rate 18, height 1.65 m (5' 4.96\"), weight 81.7 kg (180 lb 3.2 oz), SpO2 96 %.  180 lbs 3.2 oz      GENERAL:  Comfortable.  PSYCH: pleasant, oriented, No acute distress.  HEART:  RRR  LUNGS:  Normal Respiratory effort.   EXTREMITIES:  Able to ambulate independently.  SKIN:  Dry to touch, No rash, wound or ulcerations.  NEUROLOGIC:  Grossly intact.     Katy Santizo PA-C   "

## 2020-02-21 NOTE — PLAN OF CARE
Patient's After Visit Summary was reviewed with patient and/or spouse.   Patient verbalized understanding of After Visit Summary, recommended follow up and was given an opportunity to ask questions.   Discharge medications sent home with patient/family: YES   Discharged with spouse.    AVS reviewed w/ pt. Pt taking wheel chair off unit. Leaving via private transport.

## 2020-02-21 NOTE — H&P
History and Physical     Myke Fraire MRN# [id number]   YOB: 1945 Age: 74 year old      Date of Admission:  2/20/2020    Primary care provider: Bi Suarez          Assessment and Plan:   Myke Fraire is a 74 year old male with a PMH significant for benign pancreatic lesion, and not other significant chronic medical illness, who presents with acute onset of n/v/d that woke him up at 4:00am. He came in for further eval given continuous diarrhea every hour. Upon questioning, he c/o vertigo like sxs and dizziness that seemed more consistent with gait imbalance than Vertigo therefore prompting the MRI.   MRI actually showed a tiny 4 mm acute to subacute lacunar stroke with evidence of old chronic infarcts in the bilateral cerebellar hemispheres. He is being admitted for further cares.      1. Acute n/v/d--sxs sounds c/w gastroenteritis. No recent ill contact, new foods or travel or antibiotics.   --enteric precation  --Send off enteric panel  --Supportive cares with IVF   --Check orthostatics    2. C/o dizziness, vertiginous sxs --sxs now resolved. Not completely consistent with vertigo and not sure this is related to MRI findings. No hx of BPPV. Certainly could be related to acute viral illness but will check orthostatics.   --Cont IVF for now  --Consider meclizine if sxs re-occurs    3. Possible Right lacunar stroke--appears that this patient might have had previous tiny small strokes in the bilateral hemisphere.  No significant history of CVA in the past or in the family  --We will place on telemetry, echo with bubble  --Start on baby aspirin  --Check lipid panel in the morning and start on atorvastatin  --Blood pressure is borderline but will allow some mild permissive hypertension and assess tomorrow  --Stroke neurology to see tomorrow    4.  Known history of pancreatic lesion--has been followed by surgeon as outpatient  --CT scan today actually shows no lesion  --Follow up as  instructed    [place] to OBS  FULL code  Dispo: likely tomorrow         Chief Complaint:   n/v/d     History of Present Illness:   Myke Fraire is a 74 year old male with a PMH significant for benign pancreatic lesion, and not other significant chronic medical illness, who presents with acute onset of n/v/d that woke him up at 4:00am.  Patient states that he had a been normal health and had gone to eat a regular meal the night before he then woke up at 4 AM with intractable nausea vomiting and diarrhea.  Anything he ate came out as watery diarrhea no blood.  He has no associated symptoms such as fevers or chills.  Due to the ongoing nature of his diarrhea and vomiting he decided come into the emergency room for further evaluation.  He came in for further eval given continuous diarrhea every hour.   Upon questioning in the ED, he c/o vertigo like sxs and dizziness that seemed more consistent with gait imbalance than Vertigo therefore prompting the MRI. MRI actually showed a tiny 4 mm acute to subacute lacunar stroke with evidence of old chronic infarcts in the bilateral cerebellar hemispheres. He is being admitted for further cares.      Currently he is denying any dizziness.          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 A prior CT in 2014 had shown the same lesion although it was less prominent then)          Past Surgical History:     Past Surgical History:   Procedure Laterality Date     C NONSPECIFIC PROCEDURE      disk surgery, lumbar spine     HC COLONOSCOPY THRU STOMA, DIAGNOSTIC  2005    normal - repeat in 10 years     HC REMOVAL GALLBLADDER      Cholecystectomy     UPPER GI ENDOSCOPY  5/2005    gastitis and duodenitis     UPPER GI ENDOSCOPY  2/2008    gastritis and duodenitis           Social History:     Social History     Socioeconomic History     Marital status: Single      Spouse name: Not on file     Number of children: Not on file     Years of education: Not on file     Highest education level: Not on file   Occupational History     Not on file   Social Needs     Financial resource strain: Not on file     Food insecurity:     Worry: Not on file     Inability: Not on file     Transportation needs:     Medical: Not on file     Non-medical: Not on file   Tobacco Use     Smoking status: Never Smoker     Smokeless tobacco: Never Used   Substance and Sexual Activity     Alcohol use: Yes     Comment: occasionally     Drug use: No     Sexual activity: Yes     Partners: Female   Lifestyle     Physical activity:     Days per week: Not on file     Minutes per session: Not on file     Stress: Not on file   Relationships     Social connections:     Talks on phone: Not on file     Gets together: Not on file     Attends Restoration service: Not on file     Active member of club or organization: Not on file     Attends meetings of clubs or organizations: Not on file     Relationship status: Not on file     Intimate partner violence:     Fear of current or ex partner: Not on file     Emotionally abused: Not on file     Physically abused: Not on file     Forced sexual activity: Not on file   Other Topics Concern     Parent/sibling w/ CABG, MI or angioplasty before 65F 55M? Not Asked   Social History Narrative     Not on file           Family History:     Family History   Problem Relation Age of Onset     Cancer Mother         stomach cancer     Cancer Father         throat          Allergies:      Allergies   Allergen Reactions     Nkda [No Known Drug Allergies]      No Known Allergies            Medications:     Prior to Admission medications    Medication Sig Last Dose Taking? Auth Provider   cyclobenzaprine 10 MG PO tablet Take 10 mg by mouth 2 times daily as needed for muscle spasms   Yes Reported, Patient   dicyclomine 20 MG PO tablet Take 20 mg by mouth every 6 hours as needed (cramping)   Yes  "Reported, Patient   ibuprofen (ADVIL) 200 MG capsule Take 200-400 mg by mouth every 6 hours as needed for pain or fever With meals, snack  Yes Jelani Ho MD   polyethylene glycol PO packet Take 17 g by mouth daily as needed for constipation   Yes Reported, Patient              Review of Systems:     A Comprehensive greater than 10 system review of systems was carried out.  Pertinent positives and negatives are noted above.  Otherwise negative for contributory information.          Physical Exam:   Blood pressure (!) 155/85, pulse 80, temperature 98.2  F (36.8  C), temperature source Oral, resp. rate 18, height 1.65 m (5' 4.96\"), weight 81.7 kg (180 lb 3.2 oz), SpO2 95 %.  Exam:  GENERAL:  Comfortable. Non toxic appearing  PSYCH: pleasant, oriented, No acute distress.  HEENT:  PERRLA. Normal conjunctiva, normal hearing, nasal mucosa and Oropharynx are normal.  NECK:  Supple, no neck vein distention, adenopathy or bruits, normal thyroid.  HEART:  Normal S1, S2 with no murmur, no pericardial rub, gallops or S3 or S4.  LUNGS:  Clear to auscultation, normal Respiratory effort. No wheezing, rales or ronchi.  ABDOMEN:  Soft, no hepatosplenomegaly, normal bowel sounds. Non-tender, non distended.   EXTREMITIES:  No pedal edema, +2 pulses bilateral and equal.  SKIN:  Dry to touch, No rash, wound or ulcerations.  NEUROLOGIC:  CN 2-12 intact, BL 5/5 symmetric upper and lower extremity strength, sensation is intact with no focal deficits. Finger top nose appropriate, negative drift          Data:     Recent Labs   Lab 02/20/20  1617   WBC 13.0*   HGB 18.1*   HCT 55.2*   MCV 94        Recent Labs   Lab 02/20/20  1617      POTASSIUM 3.7   CHLORIDE 107   CO2 26   ANIONGAP 4   GLC 99   BUN 21   CR 1.07   GFRESTIMATED 69   GFRESTBLACK 80   ROBBIE 8.5       Recent Labs   Lab 02/20/20  1617   TROPI <0.015       Results for orders placed or performed during the hospital encounter of 02/20/20   XR Chest 2 Views    " Narrative    EXAM: XR CHEST 2 VW  LOCATION: [facility]  DATE/TIME: 2/20/2020 5:42 PM    INDICATION: Dizziness  COMPARISON: 01/28/2017      Impression    IMPRESSION: Negative chest.   CT Abdomen Pelvis w Contrast    Narrative    EXAM: CT ABDOMEN PELVIS W CONTRAST  LOCATION: [facility]  DATE/TIME: 2/20/2020 5:36 PM    INDICATION: Abdominal pain, nausea vomiting diarrhea  COMPARISON: 06/02/2000  TECHNIQUE: CT scan of the abdomen and pelvis was performed following injection of IV contrast. Multiplanar reformats were obtained. Dose reduction techniques were used.  CONTRAST: 90 mL Isovue-370    FINDINGS:   LOWER CHEST: Mild subsegmental atelectasis or scarring at the lung bases.    HEPATOBILIARY: Prior cholecystectomy. Liver unremarkable.    PANCREAS: Normal.    SPLEEN: Normal.    ADRENAL GLANDS: Normal.    KIDNEYS/BLADDER: Normal.    BOWEL: Moderate amount of fluid throughout the colon and distal small bowel loops but no evidence for obstruction. No free air or free fluid. Normal caliber appendix.    LYMPH NODES: Normal.    VASCULATURE: Unremarkable.    PELVIC ORGANS: Moderate prostate gland enlargement.    MUSCULOSKELETAL: Normal.      Impression    IMPRESSION:   1.  No acute abnormality in the abdomen or pelvis. Moderate amount of fluid in the distal small bowel and colon consistent with history of diarrheal illness.     MR Brain w/o Contrast    Narrative    EXAM: MR BRAIN W/O CONTRAST  LOCATION: [facility]  DATE/TIME: 2/20/2020 5:05 PM    INDICATION: Vertigo  COMPARISON: None.  TECHNIQUE: Routine multiplanar multisequence head MRI without intravenous contrast.    FINDINGS:  INTRACRANIAL CONTENTS: Suspect a tiny acute to subacute infarct in the right centrum semiovale measuring approximately 4 mm (series 2.2 image 78). Given small size, this is difficult to accurately characterize on ADC. Tiny chronic lacunar infarcts in the   bilateral cerebellar hemispheres. No mass, acute  hemorrhage, or extra-axial fluid collections. Scattered nonspecific T2/FLAIR hyperintensities within the cerebral white matter most consistent with mild chronic microvascular ischemic change. Normal   ventricles and sulci. Normal position of the cerebellar tonsils.     SELLA: No abnormality accounting for technique.    OSSEOUS STRUCTURES/SOFT TISSUES: Normal marrow signal. The major intracranial vascular flow voids are maintained.     ORBITS: No abnormality accounting for technique.     SINUSES/MASTOIDS: Mild mucosal thickening scattered about the paranasal sinuses. No middle ear or mastoid effusion.       Impression    IMPRESSION:  1.  Suspect tiny 4 mm lacunar infarct in the right centrum semiovale, likely acute to subacute.   2.  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  3.  Mild inflammatory paranasal sinus disease.    Findings were discussed with Dr. Fransisco Wyatt at 1747 hours on 02/20/2020.         Thea Dumont PA-C

## 2020-02-21 NOTE — PLAN OF CARE
PRIMARY DIAGNOSIS: CVA, N/V/D  OUTPATIENT/OBSERVATION GOALS TO BE MET BEFORE DISCHARGE:  1. Orthostatic performed: Yes:          Lying Orthostatic BP: 106/57         Sitting Orthostatic BP: 108/64         Standing Orthostatic BP: 99/59     2. Diagnostic testing complete & at baseline neurologic testing: MRI brain completed. Plan for ECHO and US bilateral carotid in AM    3. Cleared by consultants (if involved): No. Neuro Stroke and Patient Learning center for stroke    4. Interpretation of cardiac rhythm per telemetry tech: SR    5. Tolerating adequate PO diet and medications: Yes    6. Return to near baseline physical activity or neurologic status: Yes, SBA for safety    Discharge Planner Nurse   Safe discharge environment identified: Yes  Barriers to discharge: Yes       Entered by: Emerson Hernandez 02/21/2020 5:14 AM    Vitals are Temp: 99  F (37.2  C) Temp src: Oral BP: 116/67 Pulse: 74 Heart Rate: 65 Resp: 18 SpO2: 96 %.  Patient is Alert and Oriented x4.  requested for tomorrow per pt's request. Up SBA.   Patient is on Enteric precuations for diarrhea and awaiting stool sample results.  Temp. Of 100.3 F overnight. Tylenol given. On Clear liquid diet, ADAT. Patient has Normal Saline 0.9% running at 100 mL per hour. Denies dizziness, SOB, and nausea. Neuros intact. LS clear. BS active. Last BM 2/21/20, loose and watery. Pain left of umbilicus when palpated otherwise denies pain. Baseline tingling to L. Foot. Stable and resting in bed. Will continue to monitor and provide cares.          Please review provider order for any additional goals.   Nurse to notify provider when observation goals have been met and patient is ready for discharge.

## 2020-02-21 NOTE — PHARMACY-ADMISSION MEDICATION HISTORY
Admission medication history interview status for this patient is complete. See [facility] admission navigator for allergy information, prior to admission medications and immunization status.     Medication history interview source(s):Patient, Family  Medication history resources (including written lists, pill bottles, clinic record):EPIC    Changes made to PTA medication list:  Added: none  Deleted: Naproxen prn  Changed: all meds to prn    Medication reconciliation/reorder completed by provider prior to medication history?  No     Do you take OTC medications (eg tylenol, ibuprofen, fish oil, eye/ear drops, etc)? Yes     For patients on insulin therapy: No      Prior to Admission medications    Medication Sig Last Dose Taking? Auth Provider   cyclobenzaprine 10 MG PO tablet Take 10 mg by mouth 2 times daily as needed for muscle spasms   Yes Reported, Patient   dicyclomine 20 MG PO tablet Take 20 mg by mouth every 6 hours as needed (cramping)   Yes Reported, Patient   ibuprofen (ADVIL) 200 MG capsule Take 200-400 mg by mouth every 6 hours as needed for pain or fever With meals, snack  Yes Jelani Ho MD   polyethylene glycol PO packet Take 17 g by mouth daily as needed for constipation   Yes Reported, Patient

## 2020-02-22 LAB
C COLI+JEJUNI+LARI FUSA STL QL NAA+PROBE: NOT DETECTED
EC STX1 GENE STL QL NAA+PROBE: NOT DETECTED
EC STX2 GENE STL QL NAA+PROBE: NOT DETECTED
ENTERIC PATHOGEN COMMENT: ABNORMAL
NOROV GI+II ORF1-ORF2 JNC STL QL NAA+PR: ABNORMAL
RVA NSP5 STL QL NAA+PROBE: NOT DETECTED
SALMONELLA SP RPOD STL QL NAA+PROBE: NOT DETECTED
SHIGELLA SP+EIEC IPAH STL QL NAA+PROBE: NOT DETECTED
V CHOL+PARA RFBL+TRKH+TNAA STL QL NAA+PR: NOT DETECTED
Y ENTERO RECN STL QL NAA+PROBE: NOT DETECTED

## 2021-01-14 ENCOUNTER — APPOINTMENT (OUTPATIENT)
Dept: CT IMAGING | Facility: CLINIC | Age: 76
End: 2021-01-14
Attending: INTERNAL MEDICINE
Payer: COMMERCIAL

## 2021-01-14 ENCOUNTER — APPOINTMENT (OUTPATIENT)
Dept: GENERAL RADIOLOGY | Facility: CLINIC | Age: 76
End: 2021-01-14
Attending: PHYSICIAN ASSISTANT
Payer: COMMERCIAL

## 2021-01-14 ENCOUNTER — HOSPITAL ENCOUNTER (OUTPATIENT)
Facility: CLINIC | Age: 76
Setting detail: OBSERVATION
Discharge: HOME | End: 2021-01-15
Attending: PHYSICIAN ASSISTANT | Admitting: INTERNAL MEDICINE
Payer: COMMERCIAL

## 2021-01-14 DIAGNOSIS — I10 ESSENTIAL HYPERTENSION: Primary | ICD-10-CM

## 2021-01-14 DIAGNOSIS — R05.9 COUGH: ICD-10-CM

## 2021-01-14 DIAGNOSIS — J98.01 ACUTE BRONCHOSPASM: ICD-10-CM

## 2021-01-14 DIAGNOSIS — R07.9 CHEST PAIN: ICD-10-CM

## 2021-01-14 LAB
ALBUMIN SERPL-MCNC: 4 G/DL (ref 3.4–5)
ALP SERPL-CCNC: 93 U/L (ref 40–150)
ALT SERPL W P-5'-P-CCNC: 29 U/L (ref 0–70)
ANION GAP SERPL CALCULATED.3IONS-SCNC: 2 MMOL/L (ref 3–14)
AST SERPL W P-5'-P-CCNC: 15 U/L (ref 0–45)
BASOPHILS # BLD AUTO: 0.1 10E9/L (ref 0–0.2)
BASOPHILS NFR BLD AUTO: 0.7 %
BILIRUB SERPL-MCNC: 1.1 MG/DL (ref 0.2–1.3)
BUN SERPL-MCNC: 18 MG/DL (ref 7–30)
CALCIUM SERPL-MCNC: 9.4 MG/DL (ref 8.5–10.1)
CHLORIDE SERPL-SCNC: 109 MMOL/L (ref 94–109)
CO2 SERPL-SCNC: 28 MMOL/L (ref 20–32)
CREAT SERPL-MCNC: 1.01 MG/DL (ref 0.66–1.25)
D DIMER PPP FEU-MCNC: 0.7 UG/ML FEU (ref 0–0.5)
DIFFERENTIAL METHOD BLD: NORMAL
EOSINOPHIL # BLD AUTO: 0.2 10E9/L (ref 0–0.7)
EOSINOPHIL NFR BLD AUTO: 2.2 %
ERYTHROCYTE [DISTWIDTH] IN BLOOD BY AUTOMATED COUNT: 13 % (ref 10–15)
FLUAV RNA RESP QL NAA+PROBE: NEGATIVE
FLUBV RNA RESP QL NAA+PROBE: NEGATIVE
GFR SERPL CREATININE-BSD FRML MDRD: 73 ML/MIN/{1.73_M2}
GLUCOSE SERPL-MCNC: 99 MG/DL (ref 70–99)
HCT VFR BLD AUTO: 51.2 % (ref 40–53)
HGB BLD-MCNC: 17 G/DL (ref 13.3–17.7)
IMM GRANULOCYTES # BLD: 0.1 10E9/L (ref 0–0.4)
IMM GRANULOCYTES NFR BLD: 0.7 %
INTERPRETATION ECG - MUSE: NORMAL
LABORATORY COMMENT REPORT: NORMAL
LIPASE SERPL-CCNC: 97 U/L (ref 73–393)
LYMPHOCYTES # BLD AUTO: 1.4 10E9/L (ref 0.8–5.3)
LYMPHOCYTES NFR BLD AUTO: 19.9 %
MCH RBC QN AUTO: 31.1 PG (ref 26.5–33)
MCHC RBC AUTO-ENTMCNC: 33.2 G/DL (ref 31.5–36.5)
MCV RBC AUTO: 94 FL (ref 78–100)
MONOCYTES # BLD AUTO: 0.6 10E9/L (ref 0–1.3)
MONOCYTES NFR BLD AUTO: 9 %
NEUTROPHILS # BLD AUTO: 4.6 10E9/L (ref 1.6–8.3)
NEUTROPHILS NFR BLD AUTO: 67.5 %
NRBC # BLD AUTO: 0 10*3/UL
NRBC BLD AUTO-RTO: 0 /100
NT-PROBNP SERPL-MCNC: 80 PG/ML (ref 0–900)
PLATELET # BLD AUTO: 189 10E9/L (ref 150–450)
POTASSIUM SERPL-SCNC: 3.4 MMOL/L (ref 3.4–5.3)
PROT SERPL-MCNC: 7.5 G/DL (ref 6.8–8.8)
RBC # BLD AUTO: 5.47 10E12/L (ref 4.4–5.9)
RSV RNA SPEC QL NAA+PROBE: NORMAL
SARS-COV-2 RNA RESP QL NAA+PROBE: NEGATIVE
SODIUM SERPL-SCNC: 139 MMOL/L (ref 133–144)
SPECIMEN SOURCE: NORMAL
TROPONIN I SERPL-MCNC: <0.015 UG/L (ref 0–0.04)
WBC # BLD AUTO: 6.8 10E9/L (ref 4–11)

## 2021-01-14 PROCEDURE — G0378 HOSPITAL OBSERVATION PER HR: HCPCS

## 2021-01-14 PROCEDURE — 99285 EMERGENCY DEPT VISIT HI MDM: CPT | Mod: 25

## 2021-01-14 PROCEDURE — 83880 ASSAY OF NATRIURETIC PEPTIDE: CPT | Performed by: PHYSICIAN ASSISTANT

## 2021-01-14 PROCEDURE — 36415 COLL VENOUS BLD VENIPUNCTURE: CPT | Performed by: INTERNAL MEDICINE

## 2021-01-14 PROCEDURE — 83690 ASSAY OF LIPASE: CPT | Performed by: PHYSICIAN ASSISTANT

## 2021-01-14 PROCEDURE — 250N000011 HC RX IP 250 OP 636: Performed by: PHYSICIAN ASSISTANT

## 2021-01-14 PROCEDURE — 85379 FIBRIN DEGRADATION QUANT: CPT | Performed by: PHYSICIAN ASSISTANT

## 2021-01-14 PROCEDURE — 250N000013 HC RX MED GY IP 250 OP 250 PS 637: Performed by: INTERNAL MEDICINE

## 2021-01-14 PROCEDURE — 84484 ASSAY OF TROPONIN QUANT: CPT | Performed by: INTERNAL MEDICINE

## 2021-01-14 PROCEDURE — 84484 ASSAY OF TROPONIN QUANT: CPT | Performed by: PHYSICIAN ASSISTANT

## 2021-01-14 PROCEDURE — 80053 COMPREHEN METABOLIC PANEL: CPT | Performed by: PHYSICIAN ASSISTANT

## 2021-01-14 PROCEDURE — C9803 HOPD COVID-19 SPEC COLLECT: HCPCS

## 2021-01-14 PROCEDURE — 93005 ELECTROCARDIOGRAM TRACING: CPT

## 2021-01-14 PROCEDURE — 250N000013 HC RX MED GY IP 250 OP 250 PS 637: Performed by: PHYSICIAN ASSISTANT

## 2021-01-14 PROCEDURE — 99220 PR INITIAL OBSERVATION CARE,LEVEL III: CPT | Performed by: INTERNAL MEDICINE

## 2021-01-14 PROCEDURE — 85025 COMPLETE CBC W/AUTO DIFF WBC: CPT | Performed by: PHYSICIAN ASSISTANT

## 2021-01-14 PROCEDURE — 250N000009 HC RX 250: Performed by: PHYSICIAN ASSISTANT

## 2021-01-14 PROCEDURE — 71045 X-RAY EXAM CHEST 1 VIEW: CPT

## 2021-01-14 PROCEDURE — 258N000003 HC RX IP 258 OP 636: Performed by: INTERNAL MEDICINE

## 2021-01-14 PROCEDURE — 87636 SARSCOV2 & INF A&B AMP PRB: CPT | Performed by: PHYSICIAN ASSISTANT

## 2021-01-14 PROCEDURE — 71275 CT ANGIOGRAPHY CHEST: CPT

## 2021-01-14 RX ORDER — ACETAMINOPHEN 500 MG
500 TABLET ORAL DAILY PRN
COMMUNITY

## 2021-01-14 RX ORDER — SODIUM CHLORIDE 9 MG/ML
INJECTION, SOLUTION INTRAVENOUS CONTINUOUS
Status: DISCONTINUED | OUTPATIENT
Start: 2021-01-14 | End: 2021-01-15

## 2021-01-14 RX ORDER — ASPIRIN 81 MG/1
81 TABLET ORAL DAILY
Status: DISCONTINUED | OUTPATIENT
Start: 2021-01-14 | End: 2021-01-14

## 2021-01-14 RX ORDER — ASPIRIN 81 MG/1
243 TABLET, CHEWABLE ORAL ONCE
Status: COMPLETED | OUTPATIENT
Start: 2021-01-14 | End: 2021-01-14

## 2021-01-14 RX ORDER — LIDOCAINE 40 MG/G
CREAM TOPICAL
Status: DISCONTINUED | OUTPATIENT
Start: 2021-01-14 | End: 2021-01-15 | Stop reason: HOSPADM

## 2021-01-14 RX ORDER — ACETAMINOPHEN 650 MG/1
650 SUPPOSITORY RECTAL EVERY 4 HOURS PRN
Status: DISCONTINUED | OUTPATIENT
Start: 2021-01-14 | End: 2021-01-15 | Stop reason: HOSPADM

## 2021-01-14 RX ORDER — ACETAMINOPHEN 325 MG/1
650 TABLET ORAL EVERY 4 HOURS PRN
Status: DISCONTINUED | OUTPATIENT
Start: 2021-01-14 | End: 2021-01-15 | Stop reason: HOSPADM

## 2021-01-14 RX ORDER — LOSARTAN POTASSIUM 100 MG/1
100 TABLET ORAL DAILY
Status: ON HOLD | COMMUNITY
End: 2021-01-15

## 2021-01-14 RX ORDER — NITROGLYCERIN 0.4 MG/1
0.4 TABLET SUBLINGUAL EVERY 5 MIN PRN
Status: DISCONTINUED | OUTPATIENT
Start: 2021-01-14 | End: 2021-01-15 | Stop reason: HOSPADM

## 2021-01-14 RX ORDER — CLOPIDOGREL BISULFATE 75 MG/1
75 TABLET ORAL DAILY
Status: DISCONTINUED | OUTPATIENT
Start: 2021-01-14 | End: 2021-01-15 | Stop reason: HOSPADM

## 2021-01-14 RX ORDER — MAGNESIUM HYDROXIDE/ALUMINUM HYDROXICE/SIMETHICONE 120; 1200; 1200 MG/30ML; MG/30ML; MG/30ML
30 SUSPENSION ORAL EVERY 4 HOURS PRN
Status: DISCONTINUED | OUTPATIENT
Start: 2021-01-14 | End: 2021-01-15 | Stop reason: HOSPADM

## 2021-01-14 RX ORDER — ATORVASTATIN CALCIUM 20 MG/1
20 TABLET, FILM COATED ORAL EVERY EVENING
Status: DISCONTINUED | OUTPATIENT
Start: 2021-01-14 | End: 2021-01-15 | Stop reason: HOSPADM

## 2021-01-14 RX ORDER — NITROGLYCERIN 0.4 MG/1
0.4 TABLET SUBLINGUAL ONCE
Status: COMPLETED | OUTPATIENT
Start: 2021-01-14 | End: 2021-01-14

## 2021-01-14 RX ORDER — IOPAMIDOL 755 MG/ML
500 INJECTION, SOLUTION INTRAVASCULAR ONCE
Status: COMPLETED | OUTPATIENT
Start: 2021-01-14 | End: 2021-01-14

## 2021-01-14 RX ORDER — HYDROCHLOROTHIAZIDE 12.5 MG/1
25 CAPSULE ORAL DAILY
Status: DISCONTINUED | OUTPATIENT
Start: 2021-01-14 | End: 2021-01-15 | Stop reason: HOSPADM

## 2021-01-14 RX ORDER — PANTOPRAZOLE SODIUM 40 MG/1
40 TABLET, DELAYED RELEASE ORAL
Status: DISCONTINUED | OUTPATIENT
Start: 2021-01-15 | End: 2021-01-15 | Stop reason: HOSPADM

## 2021-01-14 RX ORDER — ASPIRIN 81 MG/1
162 TABLET, CHEWABLE ORAL ONCE
Status: DISCONTINUED | OUTPATIENT
Start: 2021-01-14 | End: 2021-01-14

## 2021-01-14 RX ORDER — ASPIRIN 81 MG/1
81 TABLET ORAL DAILY
Status: DISCONTINUED | OUTPATIENT
Start: 2021-01-15 | End: 2021-01-15 | Stop reason: HOSPADM

## 2021-01-14 RX ORDER — AMLODIPINE BESYLATE 5 MG/1
5 TABLET ORAL DAILY
Status: DISCONTINUED | OUTPATIENT
Start: 2021-01-14 | End: 2021-01-15 | Stop reason: HOSPADM

## 2021-01-14 RX ADMIN — ASPIRIN 81 MG CHEWABLE TABLET 243 MG: 81 TABLET CHEWABLE at 14:48

## 2021-01-14 RX ADMIN — CLOPIDOGREL BISULFATE 75 MG: 75 TABLET ORAL at 20:05

## 2021-01-14 RX ADMIN — AMLODIPINE BESYLATE 5 MG: 5 TABLET ORAL at 20:05

## 2021-01-14 RX ADMIN — SODIUM CHLORIDE: 9 INJECTION, SOLUTION INTRAVENOUS at 20:07

## 2021-01-14 RX ADMIN — SODIUM CHLORIDE 82 ML: 9 INJECTION, SOLUTION INTRAVENOUS at 17:38

## 2021-01-14 RX ADMIN — HYDROCHLOROTHIAZIDE 25 MG: 12.5 CAPSULE ORAL at 20:05

## 2021-01-14 RX ADMIN — ATORVASTATIN CALCIUM 20 MG: 20 TABLET, FILM COATED ORAL at 20:05

## 2021-01-14 RX ADMIN — IOPAMIDOL 63 ML: 755 INJECTION, SOLUTION INTRAVENOUS at 17:38

## 2021-01-14 RX ADMIN — NITROGLYCERIN 0.4 MG: 0.4 TABLET SUBLINGUAL at 15:26

## 2021-01-14 ASSESSMENT — ENCOUNTER SYMPTOMS
SHORTNESS OF BREATH: 1
ABDOMINAL PAIN: 1
FEVER: 0
COUGH: 1
DIARRHEA: 0
NAUSEA: 0

## 2021-01-14 NOTE — ED TRIAGE NOTES
Pt presents with chest pain that has been ongoing for the past month but has been worsening, also has some SOB and a cough. Pt alert, oriented x3 ABCS intact

## 2021-01-14 NOTE — ED NOTES
[facility]  ED Nurse Handoff Report    Myke Fraire is a 75 year old male   ED Chief complaint: Chest Pain  . ED Diagnosis:   Final diagnoses:   None     Allergies:   Allergies   Allergen Reactions     Nkda [No Known Drug Allergies]      No Known Allergies        Code Status: Full Code  Activity level - Baseline/Home:  Independent. Activity Level - Current:   Independent. Lift room needed: No. Bariatric: No   Needed: No   Isolation: No. Infection: Not Applicable.     Vital Signs:   Vitals:    01/14/21 1500 01/14/21 1515 01/14/21 1530 01/14/21 1535   BP: (!) 158/82 (!) 166/101 (!) 168/104 (!) 168/110   Pulse: 64 58 60    Resp: (!) 33      Temp:       SpO2: 95% 94% 94% 95%       Cardiac Rhythm:  ,   Cardiac  Cardiac Rhythm: Sinus bradycardia(SB/SR with frequent PVCs)  Pain level:    Patient confused: No. Patient Falls Risk: No.   Elimination Status: Has voided   Patient Report - Initial Complaint: Pt presents with chest pain that has been ongoing for the past month but has been worsening, also has some SOB and a cough. Pt alert, oriented x3 ABCS intact. Focused Assessment: Chest Pain Assessment - Chest Pain Reproducible?: Yes  If yes, how & where is the chest pain reproducible?: coughing   Cardiac Monitoring - EKG Monitoring: Yes  Cardiac Rhythm: SB (SB/SR with frequent PVCs)   Tests Performed: ekg, labs, xray. Abnormal Results:   Labs Ordered and Resulted from Time of ED Arrival Up to the Time of Departure from the ED   COMPREHENSIVE METABOLIC PANEL - Abnormal; Notable for the following components:       Result Value    Anion Gap 2 (*)     All other components within normal limits   D DIMER QUANTITATIVE - Abnormal; Notable for the following components:    D Dimer 0.7 (*)     All other components within normal limits   TROPONIN I   LIPASE   CBC WITH PLATELETS DIFFERENTIAL   NT PROBNP INPATIENT   INFLUENZA A/B & SARS-COV2 PCR MULTIPLEX     XR Chest Port 1 View   Final Result   IMPRESSION:  Single portable AP view of the chest was obtained.   Cardiomediastinal silhouette is within normal limits. No suspicious   focal pulmonary opacities. No significant pleural effusion or   pneumothorax.      ALVIN WHITTAKER MD        .   Treatments provided: NA  Family Comments: NA  OBS brochure/video discussed/provided to patient:  Yes  ED Medications:   Medications   aspirin (ASA) chewable tablet 243 mg (243 mg Oral Given 1/14/21 1448)   nitroGLYcerin (NITROSTAT) sublingual tablet 0.4 mg (0.4 mg Sublingual Given 1/14/21 1526)     Drips infusing:  No  For the majority of the shift, the patient's behavior Green. Interventions performed were NA.    Sepsis treatment initiated: No     Patient tested for COVID 19 prior to admission: YES    ED Nurse Name/Phone Number: Idalia Davis RN,   4:26 PM    RECEIVING UNIT ED HANDOFF REVIEW    Above ED Nurse Handoff Report was reviewed: Yes  Reviewed by: Dominique Cheek RN on January 14, 2021 at 5:54 PM

## 2021-01-14 NOTE — ED NOTES
Reassessed patient's chest pain after nitroglycerin. Patient states he was coughing hard and that aggravated the pain. Will recheck. Notified RIAN Castillo.

## 2021-01-14 NOTE — ED PROVIDER NOTES
History   Chief Complaint:  Chest Pain     The history is provided by the patient.      Myke Fraire is a 75 year old male with history of CVA and hypertension who presents with ongoing mid sternal chest pain radiating to his bilateral shoulders for the past month. The pain has mostly been intermittent, but has been more constant in the last week. The pain is exacerbated with walking and he also feels short of breath with walking. He's also had a cough for over a month now, but no fevers or hemoptysis. He states he has occasional leg swelling, but has no current leg pain or swelling. He also has some slight abdominal pain, but denies nausea or diarrhea. He is not a smoker. No known sick contact. He has no history of MI or blood clots. He denies family history of heart disease. Although, he does report taking a full aspirin daily. The patient was sent here by urgent care.     Review of Systems   Constitutional: Negative for fever.   Respiratory: Positive for cough and shortness of breath.    Cardiovascular: Positive for chest pain.   Gastrointestinal: Positive for abdominal pain. Negative for diarrhea and nausea.   All other systems reviewed and are negative.      Allergies:  No Known Allergies    Medications:  Aspirin  Atorvastatin  Plavix  Hydrochlorothiazide   Clopidogrel  Losartan    Past Medical History:    CVA  Colitis  BPH   Hypertension  Pancreatic mass  Adenomatous polyp of colon  He has no history of MI or blood clots.     Past Surgical History:    Cholecystectomy  Lumbar disk surgery      Family History:    Mother  Stomach cancer  Sister - hypertension  Father - throat cancer   He denies family history of heart disease.     Social History:  The patient was unaccompanied to the ED.  Non smoker    Physical Exam     Patient Vitals for the past 24 hrs:   BP Temp Pulse Resp SpO2   01/14/21 1630 (!) 150/101 -- 57 20 97 %   01/14/21 1615 -- -- 89 -- 96 %   01/14/21 1600 (!) 124/96 -- 54 -- 94 %   01/14/21  1545 -- -- 57 -- 94 %   01/14/21 1535 (!) 168/110 -- -- -- 95 %   01/14/21 1530 (!) 168/104 -- 60 -- 94 %   01/14/21 1515 (!) 166/101 -- 58 -- 94 %   01/14/21 1500 (!) 158/82 -- 64 (!) 33 95 %   01/14/21 1445 (!) 169/129 -- 70 13 95 %   01/14/21 1430 (!) 167/94 -- 72 -- 95 %   01/14/21 1415 (!) 154/127 97.2  F (36.2  C) 84 16 96 %       Physical Exam  General: Alert and cooperative with exam. Resting comfortably on gurney  Head:  Scalp is NC/AT  Eyes:  Conjunctiva normal, PERRL  ENT:  The external nose and ears are normal.   Neck:  Normal range of motion without rigidity.  CV:  Regular rate and rhythm    No pathologic murmur, rubs, or gallops.  Resp:  Breath sounds are clear bilaterally.  No crackles, wheezes, rhonchi, stridor.    Non-labored, no retractions or accessory muscle use  Abdomen: Abdomen is soft, no distension, no tenderness, no masses. No peritoneal signs. No CVA tenderness.  MS:  No lower extremity edema or asymmetric calf swelling. Normal ROM in all joints without effusions.  Skin:  Warm and dry, No rash or lesions noted. 2+ peripheral pulses in all extremities  Neuro: Alert and oriented x3.  No gross motor deficits.  No facial asymmetry.  Psych: Awake. Alert. Normal affect. Appropriate interactions.      Emergency Department Course     ECG:  Indication: chest pain  ECG taken at 1420, ECG read at 1426 by Dr. Edvin MD  Normal sinus rhythm  Biatrial enlargement  Left axis deviation  Incomplete right bundle branch block  Abnormal ECG  No significant changes compared to EKG dated 02/20/2020  Rate 78 bpm. WY interval 164. QRS duration 100. QT/QTc 382/435. P-R-T axes 59 -30 11.    Imaging:  XR chest port 1 view:  IMPRESSION: Single portable AP view of the chest was obtained.  Cardiomediastinal silhouette is within normal limits. No suspicious  focal pulmonary opacities. No significant pleural effusion or  pneumothorax.  Report per radiology     Laboratory:  Symptomatic COVID-19 virus by PCR:  negative  Influenza A/B by PCR: negative    BNP: 80  Lipase: 97  D Dimer (Collected 1437): 0.7 (H)  Troponin (Collected 1437): <0.015  CBC: AWNL (WBC 6.8, HGB 17.0, )  CMP: anion gap 2 (L) o/w WNL (Creatinine 1.01)    Emergency Department Course:    Reviewed:  1418 I reviewed nursing notes, vitals, past medical history and care everywhere    Assessments:  1420 I obtained history and examined the patient as noted above.   1612 I rechecked the patient and explained findings. He is amenable to admission.     Consults:   1652 I consulted with Dr. Rizo of hospitalist service.     Interventions:  1448 Aspirin 243 mg PO  1526 Nitrostat 0.4 mg sublingual    Disposition:  The patient was admitted to the hospital under the care of Dr. Rizo.       Impression & Plan   Medical Decision Making  75-year-old male presents with chest pain, shortness of breath, cough.  Broad differential considered.  EKG shows normal sinus rhythm with incomplete right bundle branch block, no acute ischemic changes or arrhythmia.  Troponin is undetectable.  He was given aspirin and nitroglycerin which resulted in resolution of chest pain.  D-dimer is within age-adjusted normal limits and I doubt pulmonary embolism as he is otherwise low risk by Wells criteria.  Chest x-ray is clear without evidence of pneumonia, pneumothorax, mediastinal widening, or evidence of congestive heart failure.  There are no high risk factors to raise concern for aortic dissection.  He has a benign abdominal examination with no tenderness palpation to suggest referred pain.    The patient is a heart score of 5.  Discussed options and at this time we will plan to admit for observation and serial troponins and cardiac rule out.  Unclear etiology of the patient's cough but no pneumonia on chest x-ray, no wheezing on exam, denies smoking history.  If cardiac work-up negative plan will be per inpatient team.  Discussed with Dr. Rizo who agrees to accept the  patient.    Covid-19  Myke Fraire was evaluated during a global COVID-19 pandemic, which necessitated consideration that the patient might be at risk for infection with the SARS-CoV-2 virus that causes COVID-19.   Applicable protocols for evaluation were followed during the patient's care.   COVID-19 was considered as part of the patient's evaluation. The plan for testing is:  a test was obtained during this visit.    Diagnosis:    ICD-10-CM    1. Chest pain  R07.9        Scribe Disclosure:  IVale, am serving as a scribe at 2:18 PM on 1/14/2021 to document services personally performed by Jonathan Viveros PA-C based on my observations and the provider's statements to me.   ,         Jonathan Viveros PA-C  01/14/21 1904

## 2021-01-14 NOTE — ED PROVIDER NOTES
Emergency Department Attending Supervision Note  1/14/2021  4:24 PM      I evaluated this patient in conjunction with Jonathan Viveros PA-C      Briefly, the patient presented with chest tightness.  Some worsening of chest tightness with exertion.  Improved with nitroglycerin here.  Has had cough for greater than 1 month.  No hx of asthma or COPD.  COVID negative here.      Exam:    Gen: alert  CV: RRR, no murmurs, 2+ distal pulses in all 4 extremities  Chest: no tenderness over the chest wall  Pulm: breath sounds equal, lungs clear  Abd: Soft, nontender  MSK: no lower extremity edema, no calf tenderness  Neuro: alert, appropriate conversation and interaction        MDM and Plan:  Chest pain in patient with multiple CAD risk factors.  CP free after nitroglycerin and aspirin in the ED.  EKG showed NSR without overt ischemic changes.  Trop neg.  Age adjusted ddimer neg and no overt PE risk factors.  Plan for admit for cardiac rule out and further eval as seen fit by inpatient team    Regarding cough- clear lungs and no wheezing, COVID neg, CXR showed no PNA.  Would defer further eval of this until after cardiac eval at this time.    Initially hypertensive but BP improved after nitroglycerin here.      Diagnosis    ICD-10-CM    1. Chest pain  R07.9 Troponin I - Now then in 4 hours x 2              Radha Gamboa MD  01/14/21 7111

## 2021-01-14 NOTE — H&P
"[facility] History and Physical    Myke Fraire MRN# [id number]   Age: 75 year old YOB: 1945     Date of Admission:  1/14/2021    Home clinic: [facility], [place]  Primary care provider: Bi Suarez          Assessment and Plan:   Assessment:   Myke Fraire is a 75 year old man who came to attention today in the emergency department with complaint of worsening shortness of breath and chest pain associated with activity. He also has been experiencing a cough over the course of last month, noting that he has had a mild dry cough in the past but that it has worsened in the course of the last month or so.    Past medical history is notable for a lacunar stroke that was identified on MRI in 2020 when the patient presented with vertigo. Evaluation at that time included echocardiogram (\"mildly positive bubble study\" suggesting patent foramen ovale) and carotid ultrasounds which were normal. Normal LV as well as RV function and no apparent valvular abnormalities identified. He does have hypertension which appears to be inadequately controlled. He is a lifelong non-smoker and there is no clear family history of early aggressive coronary artery disease.    In the emergency department, Mr. Fraire is noted to be robust appearing, in no distress but coughs frequently throughout the examination. VS: 154/127 (range 124-169/), HR 60-80, RR unlabored with oxygen saturation in the high 90s breathing room air. He is afebrile. Examination otherwise is without significant findings. Labs: Creatinine 1.01, electrolytes and LFTs normal. N-terminal proBNP 80, troponin less than measurable threshold. Troponin is 0.7 which, corrected for age is in the normal range. WBC 6.8, Hgb 17, platelet 189. EKG shows normal sinus rhythm without ST-T wave abnormalities. As noted below, I ordered a CT scan of the chest with PE protocol.    Mr. Fraire clearly confirms chest discomfort that is new for " him and progressive over the last month or so. He also indicates that he has had a dry cough that has worsened during this time. The possibility of chronic PEs is certainly considered. In addition, the patient may have chest discomfort just due to bronchospasm due to a new wheezing condition. He denies remarkable gastroesophageal reflux type symptoms but I think is legitimate to cover for this. He already had his ACE inhibitor switched around.       It seems highly likely that we are not can come to a conclusion about what is causing the patient's chest discomfort, ROSS and cough. Probably will need pulmonary follow-up as an outpatient. Otherwise could consider empirically trying bronchodilators. I have asked for respiratory therapy to do peak flows with the patient to see if they are obviously abnormal.    Dx:  1.  Chest pain, progressive over the last month.   2.  Cough, chronic with rather subacute worsening.  Possibly related to ACEI.  Pt just changed to Losartan from Lisinopril.    3.  HTN, inadequately controlled. On chart review, inadequate control is confirmed.  4.  Chronically on Atorvastatin, no clear dx dyslipidemia.   5.  Hx lacunar stroke, 2020.  Work up negative.       Plan:   1. Admit to observation on telemetry.  2. Serial troponins, echocardiogram in the morning. Assuming these are normal, the patient should have stress testing with a Lexiscan.  3. CT scan chest with PE protocol to evaluate for cough.  4. Empirically start Protonix.  5. At this time will hold losartan, resume hydrochlorothiazide 25 mg daily and start amlodipine 5 mg daily.             Chief Complaint:   Chest discomfort with activity, worsening of chronic cough.     History is obtained from the patient, emergency room physician and electronic medical record.    Myke Fraire indicates that he has been experiencing worsening chest discomfort over the course of the last month. While he had complained about dry, nonproductive cough  to his doctor just the other day, which she was switched from lisinopril to losartan, he indicates that he has been coughing for many months.    He does not have a history of known coronary artery disease. I believe he was put on atorvastatin due to concern about the lacunar stroke. Evaluation of for carotid atherosclerosis was negative. He did not undergo MRA of the brain when he had his stroke evaluation in .        Past Medical History:   Hypertension  Remote lacunar stroke  Pancreatic mass, status post EUS and biopsy in 2020. Benign findings.  Esophagitis         Past Surgical History:   Cholecystectomy  Remote lumbar spine surgery x2  Colonoscopies  EGDs           Social History:   Patient is native to [place] and speaks English very well.  He is a lifelong non-smoker and drinks about one alcoholic beverage per week.         Family History:   No identified early aggressive coronary artery disease.  Both parents  of stomach cancer.           Allergies:     Allergies   Allergen Reactions     Nkda [No Known Drug Allergies]      No Known Allergies              Medications:     Medications Prior to Admission   Medication Sig Dispense Refill Last Dose     acetaminophen (TYLENOL) 500 MG tablet Take 500 mg by mouth daily as needed for mild pain    prn     aspirin 81 MG PO EC tablet Take 1 tablet (81 mg) by mouth daily 30 tablet 0 2021 at Unknown time     atorvastatin 20 MG PO tablet Take 1 tablet (20 mg) by mouth every evening 30 tablet 0 2021 at Unknown time     clopidogrel 75 MG PO tablet Take 1 tablet (75 mg) by mouth daily 21 tablet 0 2021 at Unknown time     cyclobenzaprine 10 MG PO tablet Take 10 mg by mouth 2 times daily as needed for muscle spasms    prn     losartan (COZAAR) 100 MG tablet Take 100 mg by mouth daily   2021 at Unknown time     polyethylene glycol PO packet Take 17 g by mouth daily as needed for  constipation    Past Week at Unknown time             Review of Systems:   A comprehensive review of systems was performed and found to be negative except as described in this note           Physical Exam:   Vitals were reviewed  Temp: 97.2  F (36.2  C)   BP: (!) 166/100 Pulse: 70   Resp: 20 SpO2: 97 % O2 Device: None (Room air)    Constitutional: Awake, alert, cooperative, no apparent distress, and appears younger than stated age.  Eyes: Lids and lashes normal, pupils equal, round and reactive to light, extra ocular muscles intact, sclera clear, conjunctiva normal.  ENT: Normocephalic, without obvious abnormality, atraumatic. Oropharynx without obvious lesions. Edentulous upper only a few teeth lower. No significant posterior crowding.  Neck: Supple, symmetrical, trachea midline, no adenopathy, thyroid symmetric, not enlarged and no tenderness, skin normal.  Hematologic / Lymphatic: No cervical lymphadenopathy and no supraclavicular lymphadenopathy.  Back: Symmetric, no curvature, spinous processes are non-tender on palpation, paraspinous muscles are non-tender on palpation, no costal vertebral tenderness.  Lungs: No increased work of breathing, good air exchange, clear to auscultation bilaterally, no crackles or wheezing.  Cardiovascular: Regular rate and rhythm, normal S1 and S2, no S3 or S4, and no murmur noted.  Abdomen: Right upper quadrant and epigastric scars well-healed, normal bowel sounds, soft, non-distended, non-tender, no masses palpated, no hepatosplenomegaly.  Musculoskeletal: No redness, warmth, or swelling of the joints.  Tone is normal.  Neurologic: Awake, alert, oriented to name, place and time.  Cranial nerves II-XII are grossly intact.    Neuropsychiatric: Normal affect, mood, orientation, memory and insight.  Skin: No rashes, erythema, pallor, petechia or purpura.          Data:     Results for orders placed or performed during the hospital encounter of 01/14/21 (from the past 24 hour(s))    EKG 12-lead, tracing only   Result Value Ref Range    Interpretation ECG Click View Image link to view waveform and result    Symptomatic Influenza A/B & SARS-CoV2 (COVID-19) Virus PCR Multiplex    Specimen: Nasopharyngeal   Result Value Ref Range    Flu A/B & SARS-COV-2 PCR Source Nasopharyngeal     SARS-CoV-2 PCR Result NEGATIVE     Influenza A PCR Negative NEG^Negative    Influenza B PCR Negative NEG^Negative    Respiratory Syncytial Virus PCR (Note)     Flu A/B & SARS-CoV-2 PCR Comment (Note)    Troponin I (now)   Result Value Ref Range    Troponin I ES <0.015 0.000 - 0.045 ug/L   Comprehensive metabolic panel   Result Value Ref Range    Sodium 139 133 - 144 mmol/L    Potassium 3.4 3.4 - 5.3 mmol/L    Chloride 109 94 - 109 mmol/L    Carbon Dioxide 28 20 - 32 mmol/L    Anion Gap 2 (L) 3 - 14 mmol/L    Glucose 99 70 - 99 mg/dL    Urea Nitrogen 18 7 - 30 mg/dL    Creatinine 1.01 0.66 - 1.25 mg/dL    GFR Estimate 73 >60 mL/min/[1.73_m2]    GFR Estimate If Black 85 >60 mL/min/[1.73_m2]    Calcium 9.4 8.5 - 10.1 mg/dL    Bilirubin Total 1.1 0.2 - 1.3 mg/dL    Albumin 4.0 3.4 - 5.0 g/dL    Protein Total 7.5 6.8 - 8.8 g/dL    Alkaline Phosphatase 93 40 - 150 U/L    ALT 29 0 - 70 U/L    AST 15 0 - 45 U/L   Lipase   Result Value Ref Range    Lipase 97 73 - 393 U/L   D dimer quantitative   Result Value Ref Range    D Dimer 0.7 (H) 0.0 - 0.50 ug/ml FEU   CBC with platelets differential   Result Value Ref Range    WBC 6.8 4.0 - 11.0 10e9/L    RBC Count 5.47 4.4 - 5.9 10e12/L    Hemoglobin 17.0 13.3 - 17.7 g/dL    Hematocrit 51.2 40.0 - 53.0 %    MCV 94 78 - 100 fl    MCH 31.1 26.5 - 33.0 pg    MCHC 33.2 31.5 - 36.5 g/dL    RDW 13.0 10.0 - 15.0 %    Platelet Count 189 150 - 450 10e9/L    Diff Method Automated Method     % Neutrophils 67.5 %    % Lymphocytes 19.9 %    % Monocytes 9.0 %    % Eosinophils 2.2 %    % Basophils 0.7 %    % Immature Granulocytes 0.7 %    Nucleated RBCs 0 0 /100    Absolute Neutrophil 4.6 1.6 - 8.3  10e9/L    Absolute Lymphocytes 1.4 0.8 - 5.3 10e9/L    Absolute Monocytes 0.6 0.0 - 1.3 10e9/L    Absolute Eosinophils 0.2 0.0 - 0.7 10e9/L    Absolute Basophils 0.1 0.0 - 0.2 10e9/L    Abs Immature Granulocytes 0.1 0 - 0.4 10e9/L    Absolute Nucleated RBC 0.0    BNP   Result Value Ref Range    N-Terminal Pro BNP Inpatient 80 0 - 900 pg/mL   XR Chest Port 1 View    Narrative    CHEST ONE VIEW PORTABLE    1/14/2021 3:36 PM     HISTORY: Chest pain    COMPARISON: Chest x-ray on 2/20/2020      Impression    IMPRESSION: Single portable AP view of the chest was obtained.  Cardiomediastinal silhouette is within normal limits. No suspicious  focal pulmonary opacities. No significant pleural effusion or  pneumothorax.    ALIVN WHITTAKER MD   CT Chest Pulmonary Embolism w Contrast    Narrative    EXAM: CT CHEST PULMONARY EMBOLISM W CONTRAST  LOCATION: [facility]  DATE/TIME: 1/14/2021 5:35 PM    INDICATION: Shortness of breath positive D-dimer  COMPARISON: None.  TECHNIQUE: CT chest pulmonary angiogram during arterial phase injection of IV contrast. Multiplanar reformats and MIP reconstructions were performed. Dose reduction techniques were used.   CONTRAST: 63mL [id number]-370    FINDINGS:  ANGIOGRAM CHEST: No evidence for pulmonary embolism. Pulmonary arteries normal in caliber. Thoracic aorta normal in caliber. No aortic dissection or other acute abnormality.    HEART: Cardiac chambers within normal limits. No pericardial effusion. Mild coronary artery calcification.    LUNGS AND PLEURA: No pulmonary mass, consolidation, or suspicious pulmonary nodule. No pleural effusion or pneumothorax.    MEDIASTINUM: No adenopathy or mass.    LIMITED UPPER ABDOMEN: Negative.    MUSCULOSKELETAL: Stable T8 compression deformity and probable underlying vertebral hemangioma.      Impression    IMPRESSION:  1.  No evidence for pulmonary embolism or other acute abnormality in the chest.      EKG results:   Performed on admission         Normal sinus rhythm, normal axis, no acute ischemic ST segment or T wave changes      All imaging studies reviewed by me.      Attestation:  I have reviewed today's vital signs, notes, medications, labs and imaging.  Total time: 30 minutes     Liam Rizo MD

## 2021-01-15 ENCOUNTER — APPOINTMENT (OUTPATIENT)
Dept: NUCLEAR MEDICINE | Facility: CLINIC | Age: 76
End: 2021-01-15
Attending: INTERNAL MEDICINE
Payer: COMMERCIAL

## 2021-01-15 ENCOUNTER — APPOINTMENT (OUTPATIENT)
Dept: CARDIOLOGY | Facility: CLINIC | Age: 76
End: 2021-01-15
Attending: INTERNAL MEDICINE
Payer: COMMERCIAL

## 2021-01-15 VITALS
TEMPERATURE: 97.2 F | OXYGEN SATURATION: 96 % | SYSTOLIC BLOOD PRESSURE: 113 MMHG | WEIGHT: 185 LBS | BODY MASS INDEX: 30.82 KG/M2 | RESPIRATION RATE: 18 BRPM | HEART RATE: 67 BPM | DIASTOLIC BLOOD PRESSURE: 78 MMHG

## 2021-01-15 LAB
CV STRESS MAX HR HE: 72
RATE PRESSURE PRODUCT: NORMAL
STRESS ECHO BASELINE DIASTOLIC HE: 87
STRESS ECHO BASELINE HR: 55
STRESS ECHO BASELINE SYSTOLIC BP: 144
STRESS ECHO CALCULATED PERCENT HR: 49 %
STRESS ECHO LAST STRESS DIASTOLIC BP: 77
STRESS ECHO LAST STRESS SYSTOLIC BP: 141
STRESS ECHO TARGET HR: 147

## 2021-01-15 PROCEDURE — 250N000013 HC RX MED GY IP 250 OP 250 PS 637: Performed by: INTERNAL MEDICINE

## 2021-01-15 PROCEDURE — 78452 HT MUSCLE IMAGE SPECT MULT: CPT

## 2021-01-15 PROCEDURE — 78452 HT MUSCLE IMAGE SPECT MULT: CPT | Mod: 26 | Performed by: INTERNAL MEDICINE

## 2021-01-15 PROCEDURE — G0378 HOSPITAL OBSERVATION PER HR: HCPCS

## 2021-01-15 PROCEDURE — A9502 TC99M TETROFOSMIN: HCPCS | Performed by: INTERNAL MEDICINE

## 2021-01-15 PROCEDURE — 250N000013 HC RX MED GY IP 250 OP 250 PS 637: Performed by: PHYSICIAN ASSISTANT

## 2021-01-15 PROCEDURE — 93018 CV STRESS TEST I&R ONLY: CPT | Performed by: INTERNAL MEDICINE

## 2021-01-15 PROCEDURE — 93017 CV STRESS TEST TRACING ONLY: CPT

## 2021-01-15 PROCEDURE — 250N000011 HC RX IP 250 OP 636

## 2021-01-15 PROCEDURE — 93306 TTE W/DOPPLER COMPLETE: CPT | Mod: 26 | Performed by: INTERNAL MEDICINE

## 2021-01-15 PROCEDURE — 93306 TTE W/DOPPLER COMPLETE: CPT

## 2021-01-15 PROCEDURE — 258N000003 HC RX IP 258 OP 636: Performed by: INTERNAL MEDICINE

## 2021-01-15 PROCEDURE — 343N000001 HC RX 343: Performed by: INTERNAL MEDICINE

## 2021-01-15 PROCEDURE — 99217 PR OBSERVATION CARE DISCHARGE: CPT | Performed by: PHYSICIAN ASSISTANT

## 2021-01-15 PROCEDURE — 93016 CV STRESS TEST SUPVJ ONLY: CPT | Performed by: INTERNAL MEDICINE

## 2021-01-15 RX ORDER — ALBUTEROL SULFATE 90 UG/1
2 AEROSOL, METERED RESPIRATORY (INHALATION) EVERY 4 HOURS PRN
Status: DISCONTINUED | OUTPATIENT
Start: 2021-01-15 | End: 2021-01-15 | Stop reason: HOSPADM

## 2021-01-15 RX ORDER — DEXTROMETHORPHAN POLISTIREX 30 MG/5ML
30 SUSPENSION ORAL EVERY 12 HOURS PRN
COMMUNITY
Start: 2021-01-15

## 2021-01-15 RX ORDER — POLYETHYLENE GLYCOL 3350 17 G/17G
17 POWDER, FOR SOLUTION ORAL DAILY PRN
Status: DISCONTINUED | OUTPATIENT
Start: 2021-01-15 | End: 2021-01-15 | Stop reason: HOSPADM

## 2021-01-15 RX ORDER — REGADENOSON 0.08 MG/ML
0.4 INJECTION, SOLUTION INTRAVENOUS ONCE
Status: COMPLETED | OUTPATIENT
Start: 2021-01-15 | End: 2021-01-15

## 2021-01-15 RX ORDER — ALBUTEROL SULFATE 90 UG/1
2 AEROSOL, METERED RESPIRATORY (INHALATION) EVERY 6 HOURS PRN
Qty: 1 INHALER | Refills: 0 | Status: SHIPPED | OUTPATIENT
Start: 2021-01-15

## 2021-01-15 RX ORDER — DEXTROMETHORPHAN POLISTIREX 30 MG/5ML
30 SUSPENSION ORAL EVERY 12 HOURS SCHEDULED
Status: DISCONTINUED | OUTPATIENT
Start: 2021-01-15 | End: 2021-01-15 | Stop reason: HOSPADM

## 2021-01-15 RX ORDER — BENZONATATE 100 MG/1
100 CAPSULE ORAL 3 TIMES DAILY PRN
Status: DISCONTINUED | OUTPATIENT
Start: 2021-01-15 | End: 2021-01-15 | Stop reason: HOSPADM

## 2021-01-15 RX ORDER — AMLODIPINE BESYLATE 5 MG/1
5 TABLET ORAL DAILY
Qty: 30 TABLET | Refills: 0 | Status: SHIPPED | OUTPATIENT
Start: 2021-01-16

## 2021-01-15 RX ORDER — HYDROCHLOROTHIAZIDE 25 MG/1
25 TABLET ORAL DAILY
Qty: 30 TABLET | Refills: 0 | COMMUNITY
Start: 2021-01-15

## 2021-01-15 RX ORDER — SENNOSIDES 8.6 MG
8.6 TABLET ORAL 2 TIMES DAILY PRN
Status: DISCONTINUED | OUTPATIENT
Start: 2021-01-15 | End: 2021-01-15 | Stop reason: HOSPADM

## 2021-01-15 RX ORDER — BENZONATATE 100 MG/1
100 CAPSULE ORAL 3 TIMES DAILY PRN
Qty: 15 CAPSULE | Refills: 0 | Status: SHIPPED | OUTPATIENT
Start: 2021-01-15

## 2021-01-15 RX ORDER — HYDROCHLOROTHIAZIDE 25 MG/1
25 TABLET ORAL DAILY
Qty: 30 TABLET | Refills: 0 | Status: SHIPPED | OUTPATIENT
Start: 2021-01-15 | End: 2021-01-15

## 2021-01-15 RX ORDER — REGADENOSON 0.08 MG/ML
INJECTION, SOLUTION INTRAVENOUS
Status: COMPLETED
Start: 2021-01-15 | End: 2021-01-15

## 2021-01-15 RX ADMIN — PANTOPRAZOLE SODIUM 40 MG: 40 TABLET, DELAYED RELEASE ORAL at 06:37

## 2021-01-15 RX ADMIN — REGADENOSON 0.4 MG: 0.08 INJECTION, SOLUTION INTRAVENOUS at 09:37

## 2021-01-15 RX ADMIN — BENZONATATE 100 MG: 100 CAPSULE ORAL at 14:24

## 2021-01-15 RX ADMIN — CLOPIDOGREL BISULFATE 75 MG: 75 TABLET ORAL at 11:10

## 2021-01-15 RX ADMIN — HYDROCHLOROTHIAZIDE 25 MG: 12.5 CAPSULE ORAL at 11:10

## 2021-01-15 RX ADMIN — AMLODIPINE BESYLATE 5 MG: 5 TABLET ORAL at 11:10

## 2021-01-15 RX ADMIN — TETROFOSMIN 10.3 MCI.: 1.38 INJECTION, POWDER, LYOPHILIZED, FOR SOLUTION INTRAVENOUS at 07:45

## 2021-01-15 RX ADMIN — SENNOSIDES 8.6 MG: 8.6 TABLET, FILM COATED ORAL at 12:04

## 2021-01-15 RX ADMIN — TETROFOSMIN 30.2 MCI.: 1.38 INJECTION, POWDER, LYOPHILIZED, FOR SOLUTION INTRAVENOUS at 09:35

## 2021-01-15 RX ADMIN — ASPIRIN 81 MG: 81 TABLET, COATED ORAL at 11:10

## 2021-01-15 RX ADMIN — SODIUM CHLORIDE: 9 INJECTION, SOLUTION INTRAVENOUS at 11:11

## 2021-01-15 NOTE — PLAN OF CARE
Patient's After Visit Summary was reviewed with patient.   Patient verbalized understanding of After Visit Summary, recommended follow up and was given an opportunity to ask questions.   Discharge medications sent home with patient/family: Patient will  medications at his pharmacy ([facility]).   Discharged with nurse with all belongings. Patient medically cleared to discharge. Some chest pain d/t coughing (coughing medications to be picked up at his pharmacy). Family will transport home.     /78 (BP Location: Left arm)   Pulse 67   Temp 97.2  F (36.2  C) (Oral)   Resp 18   Wt 83.9 kg (185 lb)   SpO2 96%   BMI 30.82 kg/m    OBSERVATION patient END time: 1620

## 2021-01-15 NOTE — DISCHARGE SUMMARY
[facility]    Discharge Summary  Hospitalist    Date of Admission:  1/14/2021  Date of Discharge:  1/15/2021  Provider:  Danae Olivarez PA-C  Date of Service (when I last saw the patient): 01/15/21    Discharge Diagnoses   Chest pain   Acute on chronic cough  HTN    Other medical issues:  Lacunar CVA    History of Present Illness   Myke Fraire is an 75 year old male with PMH significant for benign pancreatitic lesion, HTN, and lacunar CVA who was admitted on 1/14/21 for evaluation of worsening shortness of breath and chest pain with activity. Myke Fraire indicates that he has been experiencing worsening chest discomfort over the course of the last month. While he had complained about dry, nonproductive cough to his doctor just the other day, which she was switched from lisinopril to losartan, he indicates that he has been coughing for many months.     He does not have a history of known coronary artery disease. I believe he was put on atorvastatin due to concern about the lacunar stroke. Evaluation of for carotid atherosclerosis was negative. He did not undergo MRA of the brain when he had his stroke evaluation in 2020. Please see the admission history and physical for full details.    Hospital Course   Myke Fraire was admitted on 1/14/2021.  The following problems were addressed during his hospitalization:    #Chest pain  #Acute on chronic cough: worsening chest discomfort over the last month that seems to be associated with coughing per patient. On admission he did report chest pain with activity. He had a CT of his chest in the ED due to an elevated d-dimer which was negative for PE and did not show signs of a pneumonia. He was admitted for further cardiac work up which included telemetry monitoring showed a NSR, negative serial troponin levels, echocardiogram showed preserved EF with no valvular disease, and normal Lexiscan. Suspect his chest pain is more related to  bronchospasm from ongoing coughing. Recently changed from Lisionpril to Losartan a few days ago which could be contributing. Cough was what was most concerning to patient and improved with receiving Tessalon pearls prior to discharge.   - stop Losartan   - use OTC Delsym BID and Tessalon Pearls PRN for ongoing cough  - trial albuterol inhaler for ongoing shortness of breath or wheezing due to bronchospasm   - outpatient f/u with PCP for monitoring of symptoms, if persistent consider outpatient PFTs and/or pulmonary referral     #HTN: initially hypertensive up to 160/100s, improved with adjustments to regimen as outlined below.   - stop Losartan  - continue PTA hydrochlorothiazide 25 mg daily   - start Amlodipine 5 mg daily     Pending Results   None    Code Status   Full Code       Primary Care Physician   Bi Suarez    Exam:    /78 (BP Location: Left arm)   Pulse 67   Temp 97.2  F (36.2  C) (Oral)   Resp 18   Wt 83.9 kg (185 lb)   SpO2 96%   BMI 30.82 kg/m    GENERAL:  Comfortable.  PSYCH: pleasant, oriented, No acute distress.  HEENT:  PERRLA. Normal conjunctiva, normal hearing, nasal mucosa and oropharynx are normal.  NECK:  Supple, no neck vein distention, adenopathy or bruits, normal thyroid.  HEART:  Normal S1, S2 with no murmur, no pericardial rub, gallops or S3 or S4.  LUNGS:  Clear to auscultation, normal respiratory effort. No wheezing, rales or ronchi. Intermittent dry cough during interview.  ABDOMEN:  Soft, no hepatosplenomegaly, normal bowel sounds. Non-tender, non distended.   EXTREMITIES:  No pedal edema, +2 radial and posterior tibial pulses bilateral and equal.  SKIN:  Dry to touch, No rash, wound or ulcerations.  NEUROLOGIC:  CN 2-12 intact, BL 5/5 symmetric upper and lower extremity strength, sensation is intact with no focal deficits.     Discharge Disposition   Discharged to home    Consultations This Hospital Stay   RESPIRATORY CARE IP CONSULT    Time Spent on this Encounter    IKayley PA-C, personally saw the patient today and spent greater than 30 minutes discharging this patient.    Discharge Orders      Reason for your hospital stay    You were admitted to the observation unit for chest pain and worsening cough. Your heart was monitored overnight with no abnormal findings. Your cardiac enzymes were negative for heart attack. You had an echocardiogram (ultrasound of your heart) showing normal pumping function, no valve disease and lexiscan (chemical stress test) that was negative for heart disease so we do not believe your chest pain was related to your heart. Your main compliant was a chronic cough that was acutely worse prior to admission. You had a CT of your chest which was negative for a blood clot or pneumonia. Your Losartan was stopped since this could still be contributing to your ongoing cough. You were treated supportively with Tessalon pearls with improvement in your cough. You will also be provided prescriptions for new blood pressure medications (Amlodipine and hydrochlorothiazide) as well as an albuterol inhaler for shortness of breath or wheezing. You can take over the counter Delsym twice daily for your cough as well. We recommend you follow up with your primary doctor if you continue to have chest pain or shortness of breath.     Follow-up and recommended labs and tests     Follow up with primary care provider, Bi Suarez, within 7 days for hospital follow-up and assess improvement of cough.  No follow up labs or test are needed.     Activity    Your activity upon discharge: activity as tolerated     Full Code     Diet    Follow this diet upon discharge: Orders Placed This Encounter      Regular Diet Adult     Discharge Medications   Current Discharge Medication List      START taking these medications    Details   albuterol (PROAIR HFA/PROVENTIL HFA/VENTOLIN HFA) 108 (90 Base) MCG/ACT inhaler Inhale 2 puffs into the lungs every 6 hours as needed  for shortness of breath / dyspnea  Qty: 1 Inhaler, Refills: 0    Comments: Pharmacy may dispense brand covered by insurance (Proair, or proventil or ventolin or generic albuterol inhaler)  Associated Diagnoses: Cough; Acute bronchospasm      amLODIPine (NORVASC) 5 MG tablet Take 1 tablet (5 mg) by mouth daily  Qty: 30 tablet, Refills: 0    Associated Diagnoses: Essential hypertension      benzonatate (TESSALON) 100 MG capsule Take 1 capsule (100 mg) by mouth 3 times daily as needed for cough  Qty: 15 capsule, Refills: 0    Associated Diagnoses: Cough; Acute bronchospasm      dextromethorphan (DELSYM) 30 MG/5ML liquid Take 5 mLs (30 mg) by mouth every 12 hours as needed for cough    Associated Diagnoses: Cough; Acute bronchospasm      hydrochlorothiazide (HYDRODIURIL) 25 MG tablet Take 1 tablet (25 mg) by mouth daily  Qty: 30 tablet, Refills: 0    Associated Diagnoses: Essential hypertension         CONTINUE these medications which have NOT CHANGED    Details   acetaminophen (TYLENOL) 500 MG tablet Take 500 mg by mouth daily as needed for mild pain       aspirin 81 MG PO EC tablet Take 1 tablet (81 mg) by mouth daily  Qty: 30 tablet, Refills: 0    Associated Diagnoses: Cerebrovascular accident (CVA), unspecified mechanism (H)      atorvastatin 20 MG PO tablet Take 1 tablet (20 mg) by mouth every evening  Qty: 30 tablet, Refills: 0    Associated Diagnoses: Cerebrovascular accident (CVA), unspecified mechanism (H)      clopidogrel 75 MG PO tablet Take 1 tablet (75 mg) by mouth daily  Qty: 21 tablet, Refills: 0    Associated Diagnoses: Cerebrovascular accident (CVA), unspecified mechanism (H)      cyclobenzaprine 10 MG PO tablet Take 10 mg by mouth 2 times daily as needed for muscle spasms       polyethylene glycol PO packet Take 17 g by mouth daily as needed for constipation          STOP taking these medications       losartan (COZAAR) 100 MG tablet Comments:   Reason for Stopping:             Allergies   Allergies    Allergen Reactions     Nkda [No Known Drug Allergies]      No Known Allergies      Data   Results for orders placed or performed during the hospital encounter of 01/14/21   XR Chest Port 1 View     Status: None    Narrative    CHEST ONE VIEW PORTABLE    1/14/2021 3:36 PM     HISTORY: Chest pain    COMPARISON: Chest x-ray on 2/20/2020      Impression    IMPRESSION: Single portable AP view of the chest was obtained.  Cardiomediastinal silhouette is within normal limits. No suspicious  focal pulmonary opacities. No significant pleural effusion or  pneumothorax.    ALVIN WHITTAKER MD   CT Chest Pulmonary Embolism w Contrast     Status: None    Narrative    EXAM: CT CHEST PULMONARY EMBOLISM W CONTRAST  LOCATION: [facility]  DATE/TIME: 1/14/2021 5:35 PM    INDICATION: Shortness of breath positive D-dimer  COMPARISON: None.  TECHNIQUE: CT chest pulmonary angiogram during arterial phase injection of IV contrast. Multiplanar reformats and MIP reconstructions were performed. Dose reduction techniques were used.   CONTRAST: 63mL Isovue-370    FINDINGS:  ANGIOGRAM CHEST: No evidence for pulmonary embolism. Pulmonary arteries normal in caliber. Thoracic aorta normal in caliber. No aortic dissection or other acute abnormality.    HEART: Cardiac chambers within normal limits. No pericardial effusion. Mild coronary artery calcification.    LUNGS AND PLEURA: No pulmonary mass, consolidation, or suspicious pulmonary nodule. No pleural effusion or pneumothorax.    MEDIASTINUM: No adenopathy or mass.    LIMITED UPPER ABDOMEN: Negative.    MUSCULOSKELETAL: Stable T8 compression deformity and probable underlying vertebral hemangioma.      Impression    IMPRESSION:  1.  No evidence for pulmonary embolism or other acute abnormality in the chest.   Troponin I (now)     Status: None   Result Value Ref Range    Troponin I ES <0.015 0.000 - 0.045 ug/L   Comprehensive metabolic panel     Status: Abnormal   Result Value Ref  Range    Sodium 139 133 - 144 mmol/L    Potassium 3.4 3.4 - 5.3 mmol/L    Chloride 109 94 - 109 mmol/L    Carbon Dioxide 28 20 - 32 mmol/L    Anion Gap 2 (L) 3 - 14 mmol/L    Glucose 99 70 - 99 mg/dL    Urea Nitrogen 18 7 - 30 mg/dL    Creatinine 1.01 0.66 - 1.25 mg/dL    GFR Estimate 73 >60 mL/min/[1.73_m2]    GFR Estimate If Black 85 >60 mL/min/[1.73_m2]    Calcium 9.4 8.5 - 10.1 mg/dL    Bilirubin Total 1.1 0.2 - 1.3 mg/dL    Albumin 4.0 3.4 - 5.0 g/dL    Protein Total 7.5 6.8 - 8.8 g/dL    Alkaline Phosphatase 93 40 - 150 U/L    ALT 29 0 - 70 U/L    AST 15 0 - 45 U/L   Lipase     Status: None   Result Value Ref Range    Lipase 97 73 - 393 U/L   D dimer quantitative     Status: Abnormal   Result Value Ref Range    D Dimer 0.7 (H) 0.0 - 0.50 ug/ml FEU   CBC with platelets differential     Status: None   Result Value Ref Range    WBC 6.8 4.0 - 11.0 10e9/L    RBC Count 5.47 4.4 - 5.9 10e12/L    Hemoglobin 17.0 13.3 - 17.7 g/dL    Hematocrit 51.2 40.0 - 53.0 %    MCV 94 78 - 100 fl    MCH 31.1 26.5 - 33.0 pg    MCHC 33.2 31.5 - 36.5 g/dL    RDW 13.0 10.0 - 15.0 %    Platelet Count 189 150 - 450 10e9/L    Diff Method Automated Method     % Neutrophils 67.5 %    % Lymphocytes 19.9 %    % Monocytes 9.0 %    % Eosinophils 2.2 %    % Basophils 0.7 %    % Immature Granulocytes 0.7 %    Nucleated RBCs 0 0 /100    Absolute Neutrophil 4.6 1.6 - 8.3 10e9/L    Absolute Lymphocytes 1.4 0.8 - 5.3 10e9/L    Absolute Monocytes 0.6 0.0 - 1.3 10e9/L    Absolute Eosinophils 0.2 0.0 - 0.7 10e9/L    Absolute Basophils 0.1 0.0 - 0.2 10e9/L    Abs Immature Granulocytes 0.1 0 - 0.4 10e9/L    Absolute Nucleated RBC 0.0    BNP     Status: None   Result Value Ref Range    N-Terminal Pro BNP Inpatient 80 0 - 900 pg/mL   Symptomatic Influenza A/B & SARS-CoV2 (COVID-19) Virus PCR Multiplex     Status: None    Specimen: Nasopharyngeal   Result Value Ref Range    Flu A/B & SARS-COV-2 PCR Source Nasopharyngeal     SARS-CoV-2 PCR Result NEGATIVE      Influenza A PCR Negative NEG^Negative    Influenza B PCR Negative NEG^Negative    Respiratory Syncytial Virus PCR (Note)     Flu A/B & SARS-CoV-2 PCR Comment (Note)    Troponin I - Now then in 4 hours x 2     Status: None   Result Value Ref Range    Troponin I ES <0.015 0.000 - 0.045 ug/L   Troponin I - Now then in 4 hours x 2     Status: None   Result Value Ref Range    Troponin I ES <0.015 0.000 - 0.045 ug/L   EKG 12-lead, tracing only     Status: None   Result Value Ref Range    Interpretation ECG Click View Image link to view waveform and result    Echocardiogram Complete     Status: None    Narrative    [id number]  [id number]  [id number]  [id number]^COLTON^[id number]^R           [facility]  Echocardiography Laboratory  [address] [place] [address]        Name: GAVI LAI  MRN: [id number]  : 1945  Study Date: 01/15/2021 07:41 AM  Age: 75 yrs  Gender: Male  Patient Location: [facility]  Reason For Study: Chest Pain, Dyspnea  Ordering Physician: LAYLA SEGURA  Referring Physician: Bi Suarez  Performed By: Beto Mclaughlin RDCS     BSA: 1.9 m2  Height: 64 in  Weight: 185 lb  HR: 49  BP: 161/96 mmHg  _____________________________________________________________________________  __        Procedure  Complete Portable Echo Adult.  _____________________________________________________________________________  __        Interpretation Summary     The visual ejection fraction is estimated at 55-60%.  The right ventricle is normal in structure, function and size.  The left atrium is mildly dilated.  There is mild (1+) tricuspid regurgitation.  Mild aortic root dilatation.  The ascending aorta is Mildly dilated.  _____________________________________________________________________________  __        Left Ventricle  The left ventricle is normal in size. There is normal left ventricular wall  thickness. Left ventricular systolic function is normal. The visual ejection  fraction is estimated at  55-60%. Left ventricular diastolic function is  indeterminate. No regional wall motion abnormalities noted.     Right Ventricle  The right ventricle is normal in structure, function and size.     Atria  The left atrium is mildly dilated. Right atrial size is normal. There is no  color Doppler evidence of an atrial shunt.     Mitral Valve  The mitral valve is normal in structure and function. There is trace mitral  regurgitation.        Tricuspid Valve  The tricuspid valve is normal in structure and function. There is mild (1+)  tricuspid regurgitation. The right ventricular systolic pressure is  approximated at 25.4 mmHg plus the right atrial pressure.     Aortic Valve  There is mild trileaflet aortic sclerosis. No aortic regurgitation is present.     Pulmonic Valve  The pulmonic valve is not well seen, but is grossly normal.     Vessels  Mild aortic root dilatation. The ascending aorta is Mildly dilated. The  inferior vena cava is normal.     Pericardium  There is no pericardial effusion.        Rhythm  Sinus rhythm was noted.  _____________________________________________________________________________  __  MMode/2D Measurements & Calculations  IVSd: 0.96 cm     LVIDd: 4.8 cm  LVIDs: 3.4 cm  LVPWd: 1.2 cm  FS: 30.2 %  LV mass(C)d: 189.0 grams  LV mass(C)dI: 99.9 grams/m2  Ao root diam: 3.8 cm  LA dimension: 3.5 cm  asc Aorta Diam: 3.8 cm  LA/Ao: 0.92  LVOT diam: 2.4 cm  LVOT area: 4.4 cm2  LA Volume (BP): 64.0 ml  LA Volume Index (BP): 33.9 ml/m2  RWT: 0.48        Time Measurements  Aortic HR: 54.0 BPM     Doppler Measurements & Calculations  MV E max brenda: 59.3 cm/sec  MV A max brenda: 55.5 cm/sec  MV E/A: 1.1  MV max P.4 mmHg  MV mean P.96 mmHg  MV V2 VTI: 30.6 cm  MVA(VTI): 2.4 cm2  MV dec time: 0.24 sec  LV V1 max P.9 mmHg  LV V1 max: 69.2 cm/sec  LV V1 VTI: 16.6 cm  CO(LVOT): 3.9 l/min  CI(LVOT): 2.1 l/min/m2  SV(LVOT): 72.7 ml  SI(LVOT): 38.4 ml/m2  PA acc time: 0.16 sec  TR max brenda: 251.9  cm/sec  TR max P.4 mmHg  E/E' av.7  Lateral E/e': 6.5  Medial E/e': 8.9              _____________________________________________________________________________  __        Report approved by: Erika Lopez 01/15/2021 10:23 AM      NM Lexiscan stress test (nuc card)     Status: None   Result Value Ref Range    Target      Baseline Systolic      Baseline Diastolic BP 87     Last Stress Systolic      Last Stress Diastolic BP 77     Baseline HR 55     Max HR 72     Calculated Percent HR 49 %    Rate Pressure Product 10,152.0     Narrative       The nuclear stress test is negative for inducible myocardial ischemia   or infarction.     Left ventricular function is normal, EF 67%.     There is no prior study for comparison.       Kayley Olivarez PA-C

## 2021-01-15 NOTE — PLAN OF CARE
PRIMARY DIAGNOSIS: CHEST PAIN  OUTPATIENT/OBSERVATION GOALS TO BE MET BEFORE DISCHARGE:  1. Ruled out acute coronary syndrome (negative or stable Troponin):  Trops negative x3  2. Pain Status: Improved after nitroglycerin in ED  3. Appropriate provocative testing performed: Pending  - Stress Test Procedure: Echo and Danae  - Interpretation of cardiac rhythm per telemetry tech: SB/SR 50s-60s    4. Cleared by Consultants (if applicable):N/A  5. Return to near baseline physical activity: Yes  Discharge Planner Nurse   Safe discharge environment identified: Yes  Barriers to discharge: Yes       Entered by: Emerson Hernandez 01/15/2021 12:01 AM    Vitals are Temp: 96.6  F (35.9  C) Temp src: Oral BP: (!) 144/81 Pulse: 51   Resp: 16 SpO2: 95 %.  Patient is A/Ox4. Pt's primary language is Kazakh. Would like  when providers round and when discussing tests/results. Up independently in room. NPO. Chest pain improving, rated 3/10. Occurs intermittent w/coughs. PIV infusing NS at 75 mL/hr. Denies dizziness and nausea. SOB with exertion. Frequent dry cough. LS clear. Intermittent baseline numbness to RLE. Continuing remote tele. ECHO and Danae today. Will continue to monitor.        Please review provider order for any additional goals.   Nurse to notify provider when observation goals have been met and patient is ready for discharge.

## 2021-01-15 NOTE — PLAN OF CARE
PRIMARY DIAGNOSIS: CHEST PAIN  OUTPATIENT/OBSERVATION GOALS TO BE MET BEFORE DISCHARGE:  1. Ruled out acute coronary syndrome (negative or stable Troponin):   trops negative x2  2. Pain Status: Improved after nitroglycerin in ED  3. Appropriate provocative testing performed: Pending  - Stress Test Procedure: Echo and Danae  - Interpretation of cardiac rhythm per telemetry tech: SR 61    4. Cleared by Consultants (if applicable):N/A  5. Return to near baseline physical activity: Yes  Discharge Planner Nurse   Safe discharge environment identified: Yes  Barriers to discharge: Yes       Entered by: Emerson Hernandez 01/14/2021 8:18 PM    Vitals are Temp: 97.6  F (36.4  C) Temp src: Oral BP: (!) 155/89 Pulse: 91   Resp: 16 SpO2: 95 %.  Patient is A/Ox4. Pt's primary language is Amharic. Would like  when providers round and when discussing tests/results. Up independently in room. On a cardiac diet with no caffeine. Pt does have a bottle of tea in room. Pt made aware of diet and tea moved from table. Npo at midnight. Pt reports 4/10 midsternal chest pain, radiating to left anterior chest. This occurs when coughing. Pt states pain has improved and is tolerable. PIV infusing NS at 75 mL/hr. Denies dizziness and nausea. SOB with exertion. Frequent dry cough. LS clear. Intermittent baseline numbness to RLE. Continuing remote tele. Trending trop, next check at 2230. ECHO and Danae tomorrow. Will continue to monitor.        Please review provider order for any additional goals.   Nurse to notify provider when observation goals have been met and patient is ready for discharge.

## 2021-01-15 NOTE — PLAN OF CARE
PRIMARY DIAGNOSIS: CHEST PAIN  OUTPATIENT/OBSERVATION GOALS TO BE MET BEFORE DISCHARGE:  1. Ruled out acute coronary syndrome (negative or stable Troponin):  Trops negative x3  2. Pain Status: Improved after nitroglycerin in ED  3. Appropriate provocative testing performed: Pending  - Stress Test Procedure: Echo and Danae  - Interpretation of cardiac rhythm per telemetry tech: SB/SR 50s-60s    4. Cleared by Consultants (if applicable):N/A  5. Return to near baseline physical activity: Yes  Discharge Planner Nurse   Safe discharge environment identified: Yes  Barriers to discharge: Yes       Entered by: Emerson Hernandez 01/15/2021 4:29 AM    Vitals are Temp: 96.6  F (35.9  C) Temp src: Oral BP: (!) 144/81 Pulse: 51   Resp: 16 SpO2: 95 %.  Patient is A/Ox4. Pt's primary language is Haitian. Would like  when providers round and when discussing tests/results. Up independently in room. NPO. Chest pain improving, rated 2/10. Occurs intermittent w/coughs. PIV infusing NS at 75 mL/hr. Denies dizziness and nausea. SOB with exertion. Frequent dry cough. LS clear. Intermittent baseline numbness to RLE. Continuing remote tele. ECHO and Danae today. Stable and resting in bed. Will continue to monitor.        Please review provider order for any additional goals.   Nurse to notify provider when observation goals have been met and patient is ready for discharge.

## 2021-01-15 NOTE — PLAN OF CARE
PRIMARY DIAGNOSIS: CHEST PAIN/ WORSENING SOB  OUTPATIENT/OBSERVATION GOALS TO BE MET BEFORE DISCHARGE:  1. Ruled out acute coronary syndrome (negative or stable Troponin):  Yes  2. Pain Status: Pain free.  3. Appropriate provocative testing performed: Yes  - Stress Test Procedure: Lesiscan being done right now  - Interpretation of cardiac rhythm per telemetry tech: SR/HB in the 50s and 60s     4. Cleared by Consultants (if applicable):No  5. Return to near baseline physical activity: Yes  Discharge Planner Nurse   Safe discharge environment identified: Yes  Barriers to discharge: Yes       Entered by: RONNY CHAPARRO 01/15/2021   Vital signs:  Temp: 97.4  F (36.3  C) Temp src: Oral BP: 119/65 Pulse: 64   Resp: 17 SpO2: 94 % O2 Device: None (Room air) Alert and oriented x4. Trops negative x3. CT and XR to the chest negative for PE. Complained chest pain with cough only. Sats >90% in RA. Having Lexiscan right now. NPO since midnight.  NS running @75mL/hr. Will continue to monitor.         Please review provider order for any additional goals.   Nurse to notify provider when observation goals have been met and patient is ready for discharge.

## 2021-01-15 NOTE — PLAN OF CARE
ROOM # 208-2    Living Situation (if not independent, order SW consult): Home with fiance and adult son   [facility] name:  : [facility]     Activity level at baseline: Ind  Activity level on admit: Ind       Patient registered to observation; given Patient Bill of Rights; given the opportunity to ask questions about observation status and their plan of care.  Patient has been oriented to the observation room, bathroom and call light is in place.    Discussed discharge goals and expectations with patient/family.

## 2021-01-15 NOTE — PLAN OF CARE
PRIMARY DIAGNOSIS: CHEST PAIN   OUTPATIENT/OBSERVATION GOALS TO BE MET BEFORE DISCHARGE:  1. ADLs back to baseline: Yes    2. Activity and level of assistance: Ambulating independently.    3. Pain status: chest pain with cough     4. Return to near baseline physical activity: Yes     Discharge Planner Nurse   Safe discharge environment identified: Yes  Barriers to discharge: Yes       Entered by: RONNY CHAPARRO 01/15/2021   Vital signs:  Temp: 97.2  F (36.2  C) Temp src: Oral BP: 139/85 Pulse: 54   Resp: 18 SpO2: 99 % O2 Device: None (Room air) Alert and oriented x4. Trops negative x3. CT and XR to the chest negative for PE. Complained chest pain with cough only. Sats >90% in RA. Had Lexiscan this morning. Normal result. Advanced to regular diet. NS running @75mL/hr. To be start on Tessalon and Delsym for frequent cough and to be discharged later today with Albuterol. Will give the medications once they get verified by Pharmacy. Independent. On tele running SR/SB/HR 63 per tele tech. Will continue to monitor.     Please review provider order for any additional goals.   Nurse to notify provider when observation goals have been met and patient is ready for discharge.

## 2021-01-15 NOTE — PHARMACY-ADMISSION MEDICATION HISTORY
Admission medication history interview status for this patient is complete. See [facility] admission navigator for allergy information, prior to admission medications and immunization status.     Medication history interview done via telephone during Covid-19 pandemic, indicate source(s): Patient  Medication history resources (including written lists, pill bottles, clinic record):None      Changes made to PTA medication list:  Added: cozaar, APAP  Deleted: dicyclomine, Motrin  Changed: none    Actions taken by pharmacist (provider contacted, etc):None     Additional medication history information:None    Medication reconciliation/reorder completed by provider prior to medication history?  y   (Y/N)         Prior to Admission medications    Medication Sig Last Dose Taking? Auth Provider   acetaminophen (TYLENOL) 500 MG tablet Take 500 mg by mouth daily as needed for mild pain  prn Yes Unknown, Entered By History   aspirin 81 MG PO EC tablet Take 1 tablet (81 mg) by mouth daily 1/14/2021 at Unknown time Yes Katy Santizo PA-C   atorvastatin 20 MG PO tablet Take 1 tablet (20 mg) by mouth every evening 1/13/2021 at Unknown time Yes Katy Santizo PA-C   clopidogrel 75 MG PO tablet Take 1 tablet (75 mg) by mouth daily 1/13/2021 at Unknown time Yes Katy Santizo PA-C   cyclobenzaprine 10 MG PO tablet Take 10 mg by mouth 2 times daily as needed for muscle spasms  prn Yes Reported, Patient   losartan (COZAAR) 100 MG tablet Take 100 mg by mouth daily 1/13/2021 at Unknown time Yes Unknown, Entered By History   polyethylene glycol PO packet Take 17 g by mouth daily as needed for constipation  Past Week at Unknown time Yes Reported, Patient